# Patient Record
Sex: MALE | Race: WHITE | NOT HISPANIC OR LATINO | Employment: PART TIME | URBAN - METROPOLITAN AREA
[De-identification: names, ages, dates, MRNs, and addresses within clinical notes are randomized per-mention and may not be internally consistent; named-entity substitution may affect disease eponyms.]

---

## 2024-11-04 ENCOUNTER — HOSPITAL ENCOUNTER (EMERGENCY)
Facility: MEDICAL CENTER | Age: 34
End: 2024-11-10
Attending: EMERGENCY MEDICINE
Payer: COMMERCIAL

## 2024-11-04 DIAGNOSIS — Z00.8 MEDICAL CLEARANCE FOR PSYCHIATRIC ADMISSION: ICD-10-CM

## 2024-11-04 DIAGNOSIS — F23 ACUTE PSYCHOSIS (HCC): ICD-10-CM

## 2024-11-04 LAB
AMPHET UR QL SCN: NEGATIVE
BARBITURATES UR QL SCN: NEGATIVE
BENZODIAZ UR QL SCN: NEGATIVE
BZE UR QL SCN: NEGATIVE
CANNABINOIDS UR QL SCN: POSITIVE
FENTANYL UR QL: NEGATIVE
METHADONE UR QL SCN: NEGATIVE
OPIATES UR QL SCN: NEGATIVE
OXYCODONE UR QL SCN: NEGATIVE
PCP UR QL SCN: NEGATIVE
POC BREATHALIZER: 0 PERCENT (ref 0–0.01)
PROPOXYPH UR QL SCN: NEGATIVE

## 2024-11-04 PROCEDURE — 700102 HCHG RX REV CODE 250 W/ 637 OVERRIDE(OP): Performed by: EMERGENCY MEDICINE

## 2024-11-04 PROCEDURE — 99285 EMERGENCY DEPT VISIT HI MDM: CPT

## 2024-11-04 PROCEDURE — 700111 HCHG RX REV CODE 636 W/ 250 OVERRIDE (IP): Performed by: EMERGENCY MEDICINE

## 2024-11-04 PROCEDURE — 302970 POC BREATHALIZER: Performed by: EMERGENCY MEDICINE

## 2024-11-04 PROCEDURE — 80307 DRUG TEST PRSMV CHEM ANLYZR: CPT

## 2024-11-04 PROCEDURE — A9270 NON-COVERED ITEM OR SERVICE: HCPCS | Performed by: EMERGENCY MEDICINE

## 2024-11-04 PROCEDURE — 302970 POC BREATHALIZER

## 2024-11-04 PROCEDURE — 96372 THER/PROPH/DIAG INJ SC/IM: CPT

## 2024-11-04 PROCEDURE — 90791 PSYCH DIAGNOSTIC EVALUATION: CPT

## 2024-11-04 RX ORDER — HALOPERIDOL 5 MG/ML
5 INJECTION INTRAMUSCULAR EVERY 4 HOURS PRN
Status: DISCONTINUED | OUTPATIENT
Start: 2024-11-04 | End: 2024-11-08

## 2024-11-04 RX ORDER — LORAZEPAM 1 MG/1
1 TABLET ORAL ONCE
Status: COMPLETED | OUTPATIENT
Start: 2024-11-04 | End: 2024-11-04

## 2024-11-04 RX ORDER — LORAZEPAM 2 MG/ML
1 INJECTION INTRAMUSCULAR EVERY 4 HOURS PRN
Status: DISCONTINUED | OUTPATIENT
Start: 2024-11-04 | End: 2024-11-10 | Stop reason: HOSPADM

## 2024-11-04 RX ORDER — ZIPRASIDONE MESYLATE 20 MG/ML
20 INJECTION, POWDER, LYOPHILIZED, FOR SOLUTION INTRAMUSCULAR ONCE
Status: COMPLETED | OUTPATIENT
Start: 2024-11-04 | End: 2024-11-04

## 2024-11-04 RX ORDER — HALOPERIDOL 5 MG/1
5 TABLET ORAL ONCE
Status: COMPLETED | OUTPATIENT
Start: 2024-11-04 | End: 2024-11-04

## 2024-11-04 RX ADMIN — HALOPERIDOL 5 MG: 5 TABLET ORAL at 14:46

## 2024-11-04 RX ADMIN — LORAZEPAM 1 MG: 1 TABLET ORAL at 14:46

## 2024-11-04 RX ADMIN — ZIPRASIDONE MESYLATE 20 MG: 20 INJECTION, POWDER, LYOPHILIZED, FOR SOLUTION INTRAMUSCULAR at 15:53

## 2024-11-04 RX ADMIN — LORAZEPAM 1 MG: 2 INJECTION INTRAMUSCULAR; INTRAVENOUS at 15:21

## 2024-11-04 RX ADMIN — HALOPERIDOL LACTATE 5 MG: 5 INJECTION, SOLUTION INTRAMUSCULAR at 15:21

## 2024-11-04 NOTE — ED NOTES
Patient up from bed, quickly unlocked bed and pushed bed towards door and security. RN then removed bed from room, pt now stretching on floor. Security and sitter bedside.

## 2024-11-04 NOTE — ED NOTES
ERP called, pt appears to be escalating, pt standing on bed, with hands on rail, leaning forward. Pt redirected and is now cooperative, laying in bed. Patient aware that gurney will be removed if patient stands again. Sitter and security remain bedside.
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
## 2024-11-04 NOTE — ED NOTES
Pt provided urinal, urine sent to lab. Pt resting with even chest rise and fall, sitter and security remains bedside.
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
## 2024-11-05 LAB
ALBUMIN SERPL BCP-MCNC: 4.3 G/DL (ref 3.2–4.9)
ALBUMIN/GLOB SERPL: 1.5 G/DL
ALP SERPL-CCNC: 58 U/L (ref 30–99)
ALT SERPL-CCNC: 30 U/L (ref 2–50)
ANION GAP SERPL CALC-SCNC: 11 MMOL/L (ref 7–16)
AST SERPL-CCNC: 51 U/L (ref 12–45)
BASOPHILS # BLD AUTO: 0.2 % (ref 0–1.8)
BASOPHILS # BLD: 0.02 K/UL (ref 0–0.12)
BILIRUB SERPL-MCNC: 0.5 MG/DL (ref 0.1–1.5)
BUN SERPL-MCNC: 21 MG/DL (ref 8–22)
CALCIUM ALBUM COR SERPL-MCNC: 9.4 MG/DL (ref 8.5–10.5)
CALCIUM SERPL-MCNC: 9.6 MG/DL (ref 8.5–10.5)
CHLORIDE SERPL-SCNC: 104 MMOL/L (ref 96–112)
CO2 SERPL-SCNC: 26 MMOL/L (ref 20–33)
CREAT SERPL-MCNC: 1.28 MG/DL (ref 0.5–1.4)
EKG IMPRESSION: NORMAL
EOSINOPHIL # BLD AUTO: 0.14 K/UL (ref 0–0.51)
EOSINOPHIL NFR BLD: 1.7 % (ref 0–6.9)
ERYTHROCYTE [DISTWIDTH] IN BLOOD BY AUTOMATED COUNT: 42.9 FL (ref 35.9–50)
GFR SERPLBLD CREATININE-BSD FMLA CKD-EPI: 75 ML/MIN/1.73 M 2
GLOBULIN SER CALC-MCNC: 2.9 G/DL (ref 1.9–3.5)
GLUCOSE SERPL-MCNC: 72 MG/DL (ref 65–99)
HCT VFR BLD AUTO: 42.2 % (ref 42–52)
HGB BLD-MCNC: 14.5 G/DL (ref 14–18)
IMM GRANULOCYTES # BLD AUTO: 0.02 K/UL (ref 0–0.11)
IMM GRANULOCYTES NFR BLD AUTO: 0.2 % (ref 0–0.9)
LYMPHOCYTES # BLD AUTO: 1.59 K/UL (ref 1–4.8)
LYMPHOCYTES NFR BLD: 18.9 % (ref 22–41)
MCH RBC QN AUTO: 33.4 PG (ref 27–33)
MCHC RBC AUTO-ENTMCNC: 34.4 G/DL (ref 32.3–36.5)
MCV RBC AUTO: 97.2 FL (ref 81.4–97.8)
MONOCYTES # BLD AUTO: 0.77 K/UL (ref 0–0.85)
MONOCYTES NFR BLD AUTO: 9.2 % (ref 0–13.4)
NEUTROPHILS # BLD AUTO: 5.87 K/UL (ref 1.82–7.42)
NEUTROPHILS NFR BLD: 69.8 % (ref 44–72)
NRBC # BLD AUTO: 0 K/UL
NRBC BLD-RTO: 0 /100 WBC (ref 0–0.2)
PLATELET # BLD AUTO: 285 K/UL (ref 164–446)
PMV BLD AUTO: 9.7 FL (ref 9–12.9)
POTASSIUM SERPL-SCNC: 4.9 MMOL/L (ref 3.6–5.5)
PROT SERPL-MCNC: 7.2 G/DL (ref 6–8.2)
RBC # BLD AUTO: 4.34 M/UL (ref 4.7–6.1)
SODIUM SERPL-SCNC: 141 MMOL/L (ref 135–145)
TSH SERPL DL<=0.005 MIU/L-ACNC: 1.03 UIU/ML (ref 0.38–5.33)
WBC # BLD AUTO: 8.4 K/UL (ref 4.8–10.8)

## 2024-11-05 PROCEDURE — 85025 COMPLETE CBC W/AUTO DIFF WBC: CPT

## 2024-11-05 PROCEDURE — 80053 COMPREHEN METABOLIC PANEL: CPT

## 2024-11-05 PROCEDURE — 700102 HCHG RX REV CODE 250 W/ 637 OVERRIDE(OP): Performed by: EMERGENCY MEDICINE

## 2024-11-05 PROCEDURE — 84443 ASSAY THYROID STIM HORMONE: CPT

## 2024-11-05 PROCEDURE — 36415 COLL VENOUS BLD VENIPUNCTURE: CPT

## 2024-11-05 PROCEDURE — A9270 NON-COVERED ITEM OR SERVICE: HCPCS | Performed by: EMERGENCY MEDICINE

## 2024-11-05 PROCEDURE — 93005 ELECTROCARDIOGRAM TRACING: CPT

## 2024-11-05 RX ORDER — CLONIDINE HYDROCHLORIDE 0.1 MG/1
0.2 TABLET ORAL EVERY 6 HOURS PRN
Status: DISCONTINUED | OUTPATIENT
Start: 2024-11-05 | End: 2024-11-10 | Stop reason: HOSPADM

## 2024-11-05 RX ORDER — LORAZEPAM 1 MG/1
1 TABLET ORAL ONCE
Status: COMPLETED | OUTPATIENT
Start: 2024-11-05 | End: 2024-11-05

## 2024-11-05 RX ORDER — GABAPENTIN 300 MG/1
300 CAPSULE ORAL 3 TIMES DAILY
Status: DISCONTINUED | OUTPATIENT
Start: 2024-11-05 | End: 2024-11-07

## 2024-11-05 RX ORDER — NICOTINE 21 MG/24HR
1 PATCH, TRANSDERMAL 24 HOURS TRANSDERMAL ONCE
Status: COMPLETED | OUTPATIENT
Start: 2024-11-05 | End: 2024-11-06

## 2024-11-05 RX ADMIN — GABAPENTIN 300 MG: 300 CAPSULE ORAL at 18:04

## 2024-11-05 RX ADMIN — CLONIDINE HYDROCHLORIDE 0.2 MG: 0.1 TABLET ORAL at 18:04

## 2024-11-05 RX ADMIN — LORAZEPAM 1 MG: 1 TABLET ORAL at 22:43

## 2024-11-05 RX ADMIN — GABAPENTIN 300 MG: 300 CAPSULE ORAL at 15:07

## 2024-11-05 RX ADMIN — NICOTINE TRANSDERMAL SYSTEM 21 MG: 21 PATCH, EXTENDED RELEASE TRANSDERMAL at 12:03

## 2024-11-05 NOTE — ED NOTES
Pt is lying on hospital mattress w/blankets over head, (+) chest rise/fall, NAD. Sitter and security remain in direct observation of the Pt.

## 2024-11-05 NOTE — ED NOTES
Patient resting on mattress , equal chest rise and fall, pt in 1:1 observation with sitter and  in direct view, pt in hospital scrubs, no needs at this time.

## 2024-11-05 NOTE — DISCHARGE PLANNING
Confirmed with [facility] that pt is accepted, chart is not flagged. May  proceed with admission. Spoke to [place] at [facility], transportation at 0800 confirmed.      Noel from [facility] called, states spouse has been calling with demands that the situation be discussed with risk management. Noel requests that transfer be delayed until he can talk to risk management.
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
## 2024-11-05 NOTE — ED NOTES
Bedside report received from off going RN/tech: anna, assumed care of patient.  POC discussed with patient. All needs addressed at this time.       Fall risk interventions in place: Not Applicable (all applicable per [place] Fall risk assessment)   Continuous monitoring: Not Applicable   IVF/IV medications: Not Applicable   Oxygen: Room Air  Bedside sitter: Pt on L2k SI with 1:1 sitter security (name), Report given to sitter, and checklist completed, stop sign in doorway  Isolation: Not Applicable

## 2024-11-05 NOTE — ED NOTES
Patient resting with eyes closed. Equal chest rise and fall. 1:1 sitter and security at bedside.

## 2024-11-05 NOTE — ED NOTES
Pt is resting comfortably on hospital mattress w/eyes closed, (+) chest rise/fall, NAD. Sitter and security remain in direct observation of the Pt.
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
## 2024-11-05 NOTE — ED NOTES
Pt is resting comfortably on hospital mattress, (+) chest rise/fall, NAD. Sitter and security remains in direct observation of the Pt.

## 2024-11-05 NOTE — ED NOTES
Bedside report received from off going RN/tech: Ryan MACK, assumed care of patient.  POC discussed with patient. Call light within reach, all needs addressed at this time.       Fall risk interventions in place: Move the patient closer to the nurse's station and Keep floor surfaces clean and dry (all applicable per [place] Fall risk assessment)   Continuous monitoring: Not Applicable   IVF/IV medications: Not Applicable   Oxygen: Room Air  Bedside sitter: Pt on L2k SI with 1:1 sitter Jessica (name), Report given to sitter, checklist completed, and checklist completed, stop sign in doorway  Isolation: Not Applicable

## 2024-11-05 NOTE — ED NOTES
Pt requested wife (Rizwana) be contacted and given an update. Alert team notified and they will call and inform her of any updates.  Pt provided with sandwich, sole crackers and saltines, apple sauce and fruit cup, apple juice, cranberry juice, and coffee for breakfast.

## 2024-11-05 NOTE — DISCHARGE PLANNING
Alert Team:     Referral: Adult Patient Transfer to Mental Health Facility     Intervention: Received call from Sharlene at [facility] stating that Dr. Myers has accepted the patient for admission.  Facility requests that transport be arranged for 0800.     Arranged for transportation to be set up through [facility] with [employer].     The pt will be picked up at 0800.      Notified the RN of the departure time as well as accepting facility.      Created transfer packet and placed on chart.      Plan: Pt will transfer to [facility] at 0800.

## 2024-11-05 NOTE — ED NOTES
L2K precautions in place w/ 1:1 security sitter at bedside w/ eyes on pt at all time. Pt aware of POC, reports no needs at this time.

## 2024-11-05 NOTE — ED NOTES
Pt has L2K precautions in place w/ 1:1 sitter w/ security at bedside w/ eyes on pt at all time. Pt aware of POC, reports no needs at this time.

## 2024-11-05 NOTE — ED NOTES
Pt resting, eyes closed, with even chest rise and fall, pulse ox momentarily placed on pt, pt oxygen maintaining saturation at 97%.  1:1 sitter and security remains bedside.

## 2024-11-05 NOTE — ED NOTES
Bedside report received from off going RN/tech: Alexandria, assumed care of patient.  POC discussed with patient. All needs addressed at this time.       Fall risk interventions in place: Keep floor surfaces clean and dry and Accompanied to restroom (all applicable per [place] Fall risk assessment)   Continuous monitoring: Pulse Ox or Blood Pressure  IVF/IV medications: Not Applicable   Oxygen: Room Air  Bedside sitter: Pt on L2k HI with sitter and security, Report given to sitter, and checklist completed, stop sign in doorway  Isolation: Not Applicable

## 2024-11-05 NOTE — PROGRESS NOTES
"ED Observation Progress Note    Date of Service: 11/05/24    Interval History and Interventions  Patient admitted because of threatening behavior and psychosis.  Recent visit [facility] where due to the specific and others request for treatment as an outpatient he was discharged but continues to become more psychotic and therefore is put on legal 2000    ED course today was unremarkable he did require reso nicotine patch but no other complaints at this time    Due to the chart review of uncertain etiology but appears the patient was initially can go to be behavioral health but some sort of issue arose and court order was placed.  Subsequently patient was also given [facility], [facility] and [facility] looks like [facility] has rejected the patient.    Physical Exam  BP (P) 134/59   Pulse (P) 72   Temp 36.2 °C (97.2 °F) (Temporal)   Resp (P) 18   Ht 1.803 m (5' 11\")   Wt 61.2 kg (135 lb)   SpO2 (P) 98%   BMI 18.83 kg/m² .    Constitutional: Awake and alert. Nontoxic  HENT:  Grossly normal  Eyes: Grossly normal  Neck: Normal range of motion  Cardiovascular: Normal heart rate   Thorax & Lungs: No respiratory distress  Abdomen: Nontender  Skin:  No pathologic rash.   Extremities: Well perfused  Psychiatric: Affect normal    Labs  Results for orders placed or performed during the hospital encounter of 11/04/24   POC BREATHALIZER    Collection Time: 11/04/24  2:05 PM   Result Value Ref Range    POC Breathalizer 0.00 0.00 - 0.01 Percent   Urine Drug Screen    Collection Time: 11/04/24  2:33 PM   Result Value Ref Range    Amphetamines Urine Negative Negative    Barbiturates Negative Negative    Benzodiazepines Negative Negative    Cocaine Metabolite Negative Negative    Fentanyl, Urine Negative Negative    Methadone Negative Negative    Opiates Negative Negative    Oxycodone Negative Negative    Phencyclidine -Pcp Negative Negative    Propoxyphene Negative Negative    Cannabinoid Metab " Positive (A) Negative       Radiology  No orders to display       Problem List  1.  Acute psychosis      Noah, looks like we are waiting still for [facility] and car or [facility] to accept the patient    Electronically signed by: David Geronimo M.D., 11/5/2024 1:52 PM

## 2024-11-05 NOTE — ED NOTES
Patient's mother Sharon called, permission from pt to tell mom he is here. 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 Reports hx of bipolar in family. Sharon [phone].

## 2024-11-05 NOTE — DISCHARGE PLANNING
Alert team  Referral faxed to [facility], [facility]  Macey TOLEDO

## 2024-11-05 NOTE — ED NOTES
Patient resting with eyes closed and equal chest rise and fall. 1:1 sitter and security at bedside. Patient provided with mattress pad. Patient denied further needs at this time.

## 2024-11-05 NOTE — ED NOTES
Pt ambulated to the bathroom w/steady gait and security at [facility]. All needs addressed at this time. Upon returning to room, sitter and security remain in direct observation of the Pt.

## 2024-11-05 NOTE — ED NOTES
Patient's home medications have been reviewed by the pharmacy team.     No past medical history on file.    Patient's Medications   New Prescriptions    No medications on file   Previous Medications    MELOXICAM (MOBIC) 7.5 MG TABS    Take 7.5 mg by mouth every day.   Modified Medications    No medications on file   Discontinued Medications    No medications on file          A:  Medications do not appear to be contributing to current complaints.       P:    Psychiatry provider at [facility] had recommended initiating on gabapentin 300 mg TID for mood stability, anxiety, history of neuropathy and clonidine 0.2 mg PRN for impulsivity and anxiety. No evidence that patient had picked up these prescriptions or had started taking these medications. Current agitation is being managed by IM PRN haldol and lorazepam       Garfield Gabriel, GabrielD

## 2024-11-05 NOTE — ED NOTES
Patient resting with eyes closed on mattress. Equal chest rise and fall. Patient easily awakens and denied any needs at this time. 1:1 sitter and security at bedside.

## 2024-11-05 NOTE — ED NOTES
Pt has L2K precautions in place w/ 1:1 sitter at bedside w/ eyes on pt at all time. Pt aware of POC, ambulated to restroom w/ steady gait.
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
## 2024-11-05 NOTE — DISCHARGE PLANNING
Alert Team Note     Contacted [place] at [facility] to cancel transport to [facility] due to no contact order.    Resent referral to [facility], [facility], and [facility]

## 2024-11-05 NOTE — ED NOTES
Patient resting on mattress, equal chest rise and fall, pt in 1:1 observation with sitter and security in direct view, pt in hospital scrubs, no needs at this time.

## 2024-11-05 NOTE — DISCHARGE PLANNING
Alert Team Note     Spoke to Sarai at [facility], was advised pt declined. They are unable to meet pt needs at this time. Pt too acute and aggressive for current milieu    Notified Alert Team MARTINA Baker

## 2024-11-05 NOTE — ED NOTES
Pt medicated per MAR. Pt resting, eyes closed, with even chest rise and fall, 1:1 sitter remains bedside.

## 2024-11-06 PROCEDURE — 700111 HCHG RX REV CODE 636 W/ 250 OVERRIDE (IP): Performed by: EMERGENCY MEDICINE

## 2024-11-06 PROCEDURE — 700111 HCHG RX REV CODE 636 W/ 250 OVERRIDE (IP): Mod: JZ | Performed by: EMERGENCY MEDICINE

## 2024-11-06 PROCEDURE — 700102 HCHG RX REV CODE 250 W/ 637 OVERRIDE(OP)

## 2024-11-06 PROCEDURE — A9270 NON-COVERED ITEM OR SERVICE: HCPCS

## 2024-11-06 PROCEDURE — 700102 HCHG RX REV CODE 250 W/ 637 OVERRIDE(OP): Performed by: EMERGENCY MEDICINE

## 2024-11-06 PROCEDURE — 96372 THER/PROPH/DIAG INJ SC/IM: CPT

## 2024-11-06 PROCEDURE — 99284 EMERGENCY DEPT VISIT MOD MDM: CPT | Performed by: PSYCHIATRY & NEUROLOGY

## 2024-11-06 PROCEDURE — A9270 NON-COVERED ITEM OR SERVICE: HCPCS | Performed by: EMERGENCY MEDICINE

## 2024-11-06 RX ORDER — HALOPERIDOL 5 MG/ML
5 INJECTION INTRAMUSCULAR ONCE
Status: COMPLETED | OUTPATIENT
Start: 2024-11-06 | End: 2024-11-06

## 2024-11-06 RX ORDER — HALOPERIDOL 5 MG/1
5 TABLET ORAL ONCE
Status: DISPENSED | OUTPATIENT
Start: 2024-11-06 | End: 2024-11-07

## 2024-11-06 RX ORDER — NICOTINE 21 MG/24HR
1 PATCH, TRANSDERMAL 24 HOURS TRANSDERMAL ONCE
Status: COMPLETED | OUTPATIENT
Start: 2024-11-06 | End: 2024-11-07

## 2024-11-06 RX ADMIN — CLONIDINE HYDROCHLORIDE 0.2 MG: 0.1 TABLET ORAL at 11:43

## 2024-11-06 RX ADMIN — HALOPERIDOL LACTATE 5 MG: 5 INJECTION, SOLUTION INTRAMUSCULAR at 20:24

## 2024-11-06 RX ADMIN — CLONIDINE HYDROCHLORIDE 0.2 MG: 0.1 TABLET ORAL at 04:26

## 2024-11-06 RX ADMIN — GABAPENTIN 300 MG: 300 CAPSULE ORAL at 05:05

## 2024-11-06 RX ADMIN — GABAPENTIN 300 MG: 300 CAPSULE ORAL at 11:43

## 2024-11-06 RX ADMIN — LORAZEPAM 1 MG: 2 INJECTION INTRAMUSCULAR; INTRAVENOUS at 19:14

## 2024-11-06 RX ADMIN — NICOTINE TRANSDERMAL SYSTEM 21 MG: 21 PATCH, EXTENDED RELEASE TRANSDERMAL at 09:42

## 2024-11-06 RX ADMIN — CLONIDINE HYDROCHLORIDE 0.2 MG: 0.1 TABLET ORAL at 17:42

## 2024-11-06 RX ADMIN — GABAPENTIN 300 MG: 300 CAPSULE ORAL at 17:42

## 2024-11-06 ASSESSMENT — ENCOUNTER SYMPTOMS
MUSCULOSKELETAL NEGATIVE: 1
DIARRHEA: 0
RESPIRATORY NEGATIVE: 1
NAUSEA: 0
NEUROLOGICAL NEGATIVE: 1
ABDOMINAL PAIN: 0
CARDIOVASCULAR NEGATIVE: 1
VOMITING: 0

## 2024-11-06 NOTE — ED NOTES
Pt agitated and banging on the door and yelling at staff. Attempted to verbally de-escalate with no success. Pt agrees to take medication to help calm him as long as it's PO. Updated ERP, verbal order received. Attempted to medicate pt with security at bedside.- pt calm and refusing medication at this time. Updated pt that his court time is at 14:00. Pt agrees to stay calm and cooperative. Security and safety sitter in direct view of pt.

## 2024-11-06 NOTE — ED NOTES
Patient report from MARTINA aSlmeron. Pt Alert respirations even and unlabored, on room air. Safety risk interventions in place.     Verified that Legal Hold is signed in patient's chart.  Safety checklist in use. Patient room is cleared of all items posing safety risk to minimize risk of suicide.   Stop sign filled up and placed in front of pt's room  Patient is in direct view of 1:1 safety sitter and Security, will continue to observe.  Standard fall precautions in place.

## 2024-11-06 NOTE — ED NOTES
Pt is agitated, biting self on arms.  refusing Ativan and haldol. Requesting clonidine and gabapentin. Informed pt that he received both at 1143. Pt able to be redirected to sit and eat his lunch. Pt requesting to be referred to as leobardo at this time.

## 2024-11-06 NOTE — ED NOTES
L2K precautions in place w/ 1:1 sitter at bedside w/ eyes on pt at all time. Pt aware of POC, reports no needs at this time.

## 2024-11-06 NOTE — ED NOTES
Received report from MARTINA Sepulveda. Security sitter in direct view of pt. Safety precautions in place for SI/Hi pt. Pt has court today, time unknown.

## 2024-11-06 NOTE — DISCHARGE PLANNING
Received email from  Steffi regarding patient's meeting with the court doctors today. Patient has been scheduled to meet with court doctors via the Ipad at bedside at 1400. Notified Charge MARTINA Coronado and bedside MARTINA Bowen of upcoming meeting this afternoon.

## 2024-11-06 NOTE — ED NOTES
Pt screaming at the RN to read his chart and look at his EKG because it is in my job description to do so. Pt wanting RN to update his pt board. Pt pushing code blue button and yelling at the top of his lungs. Security at bedside.

## 2024-11-06 NOTE — ED NOTES
Bedside report received from off going RN/tech: Claire, assumed care of patient.  POC discussed with patient. Call light within reach, all needs addressed at this time.       Fall risk interventions in place: Accompanied to restroom (all applicable per [place] Fall risk assessment)   Continuous monitoring: Not Applicable   IVF/IV medications: Not Applicable   Oxygen: Room Air  Bedside sitter: Pt on L2k SI with 1:1 sitter  + security  Isolation:
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
## 2024-11-06 NOTE — ED NOTES
Hourly round completed, patient attempting to do cart wheel in room, patient redirect. 1:1 sitter and security sitter are in direct view of patient, will continue to monitor patient.

## 2024-11-06 NOTE — DISCHARGE PLANNING
Legal Hold    Referral: Legal Hold Court     Intervention: Pt met with court MD's via the iPad at bedside to contest the legal hold.     Court MD's did not release pt from legal hold. Pt will need to meet with District  11/7 via video conferencing. Once time has been determined will notify bedside R.N.

## 2024-11-06 NOTE — ED NOTES
Pt laying on side, chest rise and fall even/unlabored.   Pt remains in hospital provided paper scrubs.   1:1 sitter + security  w/in line of site at all times.

## 2024-11-06 NOTE — ED NOTES
"patient is extremely restless and masturbating in room stating that \"make me relax\". This nurse speak with patient and try to educate him if he wants to channelize his energy to games that we have here in [facility], also this nurse offer him PRNs he denies taking IM meds , but agreed on PO lorazepam, ERP  notified.   "

## 2024-11-06 NOTE — ED NOTES
"Pt deciding to do \"yoga\" in room.  no acute signs of distress present. Even, unlabored breathing noted. 1:1 sitter with direct line of sight remains in place.     "

## 2024-11-06 NOTE — DISCHARGE PLANNING
Legal Hold    Referral: Legal Hold Court     Intervention: Pt presented for legal hold meeting with  via phone call.  advised pt will meet with court MD's via telemedicine monitor to contest the legal hold.      Plan: Pt will present to telemedicine mental health to meet with court physicians 11/6. Will call bedside RN once time has been determined.

## 2024-11-06 NOTE — ED NOTES
Patient pacing room. Safety sitter and security sitter in direct view of patient. Will continue to monitor.

## 2024-11-06 NOTE — ED NOTES
Patient report to MARTINA Sepulveda. Pt Alert respirations even and unlabored, on room air. Safety risk interventions in place.     Verified that Legal Hold is signed in patient's chart.  Safety checklist in use. Patient room is cleared of all items posing safety risk  Stop sign filled up and placed in front of pt's room  Patient is in direct view of security sitter and  1:1 sitter.  Standard fall precautions in place.

## 2024-11-06 NOTE — ED NOTES
Medicated pt with nicotine patch. Safety sitter Nuno and security sitter Alok in direct view of pt.

## 2024-11-06 NOTE — ED NOTES
Hourly round completed, patient resting eyes close with unlabored respirations. Face visible, No current needs identified. Security and 1:1 is in direct view of patient, will continue to monitor patient.

## 2024-11-06 NOTE — ED NOTES
Patient requested ear plug, to sleep comfortably, provided as per request, safety sitter notified.

## 2024-11-06 NOTE — PROGRESS NOTES
"ED Observation Progress Note    Date of Service: 24    Interval History and Interventions  Patient here for psychosis and threatening behavior.  Patient was seen by my preceding partners and medically cleared.  Patient was placed on legal hold for these issues and is currently awaiting placement in psychiatric facility.    Physical Exam  /54   Pulse 86   Temp 36.2 °C (97.2 °F) (Temporal)   Resp 16   Ht 1.803 m (5' 11\")   Wt 61.2 kg (135 lb)   SpO2 100%   BMI 18.83 kg/m² .    Constitutional: Awake and alert. Nontoxic  HENT:  Grossly normal  Eyes: Grossly normal  Neck: Normal range of motion  Cardiovascular: Normal heart rate   Thorax & Lungs: No respiratory distress  Skin:  No pathologic rash.   Extremities: Well perfused  Psychiatric: Affect normal    Labs  Results for orders placed or performed during the hospital encounter of 24   POC BREATHALIZER    Collection Time: 24  2:05 PM   Result Value Ref Range    POC Breathalizer 0.00 0.00 - 0.01 Percent   Urine Drug Screen    Collection Time: 24  2:33 PM   Result Value Ref Range    Amphetamines Urine Negative Negative    Barbiturates Negative Negative    Benzodiazepines Negative Negative    Cocaine Metabolite Negative Negative    Fentanyl, Urine Negative Negative    Methadone Negative Negative    Opiates Negative Negative    Oxycodone Negative Negative    Phencyclidine -Pcp Negative Negative    Propoxyphene Negative Negative    Cannabinoid Metab Positive (A) Negative   EKG    Collection Time: 24  4:34 PM   Result Value Ref Range    Report       [facility] Emergency Dept.    Test Date:  2024  Pt Name:    BARBY WETZEL             Department: ER  MRN:        [id number]                      Room:       [facility]  Gender:     Male                         Technician: [id number]  :        1990                   Requested By:FELICITY BRAVO  Order #:    [id number]                    Reading MD: Robert " Pancho    Measurements  Intervals                                Axis  Rate:       83                           P:          89  MT:         156                          QRS:        101  QRSD:       85                           T:          85  QT:         354  QTc:        416    Interpretive Statements  Sinus rhythm  No previous ECG available for comparison  Electronically Signed On 11- 22:36:52 PST by Robert Deleon     CBC WITH DIFFERENTIAL    Collection Time: 11/05/24  4:41 PM   Result Value Ref Range    WBC 8.4 4.8 - 10.8 K/uL    RBC 4.34 (L) 4.70 - 6.10 M/uL    Hemoglobin 14.5 14.0 - 18.0 g/dL    Hematocrit 42.2 42.0 - 52.0 %    MCV 97.2 81.4 - 97.8 fL    MCH 33.4 (H) 27.0 - 33.0 pg    MCHC 34.4 32.3 - 36.5 g/dL    RDW 42.9 35.9 - 50.0 fL    Platelet Count 285 164 - 446 K/uL    MPV 9.7 9.0 - 12.9 fL    Neutrophils-Polys 69.80 44.00 - 72.00 %    Lymphocytes 18.90 (L) 22.00 - 41.00 %    Monocytes 9.20 0.00 - 13.40 %    Eosinophils 1.70 0.00 - 6.90 %    Basophils 0.20 0.00 - 1.80 %    Immature Granulocytes 0.20 0.00 - 0.90 %    Nucleated RBC 0.00 0.00 - 0.20 /100 WBC    Neutrophils (Absolute) 5.87 1.82 - 7.42 K/uL    Lymphs (Absolute) 1.59 1.00 - 4.80 K/uL    Monos (Absolute) 0.77 0.00 - 0.85 K/uL    Eos (Absolute) 0.14 0.00 - 0.51 K/uL    Baso (Absolute) 0.02 0.00 - 0.12 K/uL    Immature Granulocytes (abs) 0.02 0.00 - 0.11 K/uL    NRBC (Absolute) 0.00 K/uL   Comp Metabolic Panel    Collection Time: 11/05/24  4:41 PM   Result Value Ref Range    Sodium 141 135 - 145 mmol/L    Potassium 4.9 3.6 - 5.5 mmol/L    Chloride 104 96 - 112 mmol/L    Co2 26 20 - 33 mmol/L    Anion Gap 11.0 7.0 - 16.0    Glucose 72 65 - 99 mg/dL    Bun 21 8 - 22 mg/dL    Creatinine 1.28 0.50 - 1.40 mg/dL    Calcium 9.6 8.5 - 10.5 mg/dL    Correct Calcium 9.4 8.5 - 10.5 mg/dL    AST(SGOT) 51 (H) 12 - 45 U/L    ALT(SGPT) 30 2 - 50 U/L    Alkaline Phosphatase 58 30 - 99 U/L    Total Bilirubin 0.5 0.1 - 1.5 mg/dL    Albumin 4.3 3.2 - 4.9 g/dL     Total Protein 7.2 6.0 - 8.2 g/dL    Globulin 2.9 1.9 - 3.5 g/dL    A-G Ratio 1.5 g/dL   TSH WITH REFLEX TO FT4    Collection Time: 11/05/24  4:41 PM   Result Value Ref Range    TSH 1.030 0.380 - 5.330 uIU/mL   ESTIMATED GFR    Collection Time: 11/05/24  4:41 PM   Result Value Ref Range    GFR (CKD-EPI) 75 >60 mL/min/1.73 m 2       Radiology  No orders to display       Problem List    1. Acute psychosis (HCC)    2. Medical clearance for psychiatric admission

## 2024-11-06 NOTE — ED NOTES
Hourly round completed, patient provided with apple juice and dinner tray. 1:1 sitter and security sitter are in direct view of patient, will continue to monitor patient.

## 2024-11-06 NOTE — ED NOTES
Pt continues to be anxious and pacing room. Took pt's vitals and gave PRN clonidine. Security and safety sitter in direct view of pt. Will continue to monitor.

## 2024-11-07 PROCEDURE — 700102 HCHG RX REV CODE 250 W/ 637 OVERRIDE(OP): Performed by: EMERGENCY MEDICINE

## 2024-11-07 PROCEDURE — 700111 HCHG RX REV CODE 636 W/ 250 OVERRIDE (IP): Performed by: EMERGENCY MEDICINE

## 2024-11-07 PROCEDURE — 700102 HCHG RX REV CODE 250 W/ 637 OVERRIDE(OP): Performed by: PSYCHIATRY & NEUROLOGY

## 2024-11-07 PROCEDURE — 99284 EMERGENCY DEPT VISIT MOD MDM: CPT | Performed by: PSYCHIATRY & NEUROLOGY

## 2024-11-07 PROCEDURE — A9270 NON-COVERED ITEM OR SERVICE: HCPCS | Performed by: EMERGENCY MEDICINE

## 2024-11-07 PROCEDURE — A9270 NON-COVERED ITEM OR SERVICE: HCPCS | Performed by: PSYCHIATRY & NEUROLOGY

## 2024-11-07 RX ORDER — LORAZEPAM 2 MG/ML
2 INJECTION INTRAMUSCULAR ONCE
Status: COMPLETED | OUTPATIENT
Start: 2024-11-07 | End: 2024-11-07

## 2024-11-07 RX ORDER — HALOPERIDOL 5 MG/ML
5 INJECTION INTRAMUSCULAR ONCE
Status: COMPLETED | OUTPATIENT
Start: 2024-11-07 | End: 2024-11-07

## 2024-11-07 RX ORDER — GABAPENTIN 400 MG/1
400 CAPSULE ORAL 3 TIMES DAILY
Status: DISCONTINUED | OUTPATIENT
Start: 2024-11-07 | End: 2024-11-10 | Stop reason: HOSPADM

## 2024-11-07 RX ORDER — NICOTINE 21 MG/24HR
21 PATCH, TRANSDERMAL 24 HOURS TRANSDERMAL
Status: DISCONTINUED | OUTPATIENT
Start: 2024-11-08 | End: 2024-11-10 | Stop reason: HOSPADM

## 2024-11-07 RX ORDER — NICOTINE 21 MG/24HR
1 PATCH, TRANSDERMAL 24 HOURS TRANSDERMAL ONCE
Status: COMPLETED | OUTPATIENT
Start: 2024-11-07 | End: 2024-11-08

## 2024-11-07 RX ADMIN — LORAZEPAM 2 MG: 2 INJECTION INTRAMUSCULAR; INTRAVENOUS at 10:28

## 2024-11-07 RX ADMIN — CLONIDINE HYDROCHLORIDE 0.2 MG: 0.1 TABLET ORAL at 21:14

## 2024-11-07 RX ADMIN — NICOTINE 21 MG: 21 PATCH TRANSDERMAL at 10:45

## 2024-11-07 RX ADMIN — HALOPERIDOL LACTATE 5 MG: 5 INJECTION, SOLUTION INTRAMUSCULAR at 10:26

## 2024-11-07 RX ADMIN — GABAPENTIN 300 MG: 300 CAPSULE ORAL at 10:45

## 2024-11-07 RX ADMIN — GABAPENTIN 400 MG: 400 CAPSULE ORAL at 17:22

## 2024-11-07 ASSESSMENT — ENCOUNTER SYMPTOMS
HEADACHES: 0
VOMITING: 0
NAUSEA: 0
CONSTIPATION: 0
SHORTNESS OF BREATH: 0
DIARRHEA: 0
DIZZINESS: 0
ABDOMINAL PAIN: 0

## 2024-11-07 NOTE — DISCHARGE PLANNING
"Alert Team Note     Requested updates on referral status from Coral at [facility] via [employer].     @0801 Received message from Shanthi at [facility] stating, \"at this time we are prioritizing our uninsured referrals. He will remain on our list.\"    Spoke to Juanito at [facility] confirming pt stills need placement. Referral pending review with therapist.  "

## 2024-11-07 NOTE — ED NOTES
BREAK RN:   Pt urinal emptied, pt attempting to get out of room while increasing in agitation, security de-escalating and returned pt inside room. Pt agitation increased, ERP called, security called for extra assistance. New orders placed for 5mg IM haldol. Security at bedside for medication administration. Pt educated on medication administration, pt verbalized understanding.

## 2024-11-07 NOTE — ED NOTES
Patient awake and alert , in ED room cooperative and calm at this time. Security sitter in direct view of patient.

## 2024-11-07 NOTE — ED NOTES
Pt resting on the floor mattress , no acute signs of distress present. Even, unlabored breathing noted. 1:1  with direct line of sight remains in place.

## 2024-11-07 NOTE — DISCHARGE PLANNING
Received email from  Steffi regarding patient's court hearing with District [place] this morning. Patient has been scheduled to meet with Judge Ortiz via the iPad at bedside at 0900. Notified Bedside MARTINA Morrison of upcoming hearing this morning.

## 2024-11-07 NOTE — ED NOTES
Report received from MARTINA Rangel. Assumed care of pt, pt up in room, brushing teeth. Security sitter at bedside.

## 2024-11-07 NOTE — ED NOTES
Hourly round completed, patient resting with unlabored respirations. Face visible, No current needs identified.  Gurney in low position, side rail up for pt safety. Security sitter is in direct view of patient, will continue to monitor patient.
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
## 2024-11-07 NOTE — ED NOTES
Patient's home medications have been reviewed by the pharmacy team.     No past medical history on file.    Patient's Medications   New Prescriptions    No medications on file   Previous Medications    MELOXICAM (MOBIC) 7.5 MG TABS    Take 7.5 mg by mouth every day.   Modified Medications    No medications on file   Discontinued Medications    No medications on file          Unable to complete medication history per med rec tech note       No recommendations at this time. Defer to psychiatry.         Claudia Croft Pharm.D., BCPS

## 2024-11-07 NOTE — ED NOTES
Patient report to MARTINA Rodriguez. Pt Alert respirations even and unlabored, on room air. Safety risk interventions in place.     Verified that Legal Hold is signed in patient's chart.  Safety checklist in use. Patient room is cleared of all items posing safety risk to minimize risk of HI.   Stop sign filled up and placed in front of pt's room  Patient is in direct view of security sitter, will continue to observe.  Standard fall precautions in place.

## 2024-11-07 NOTE — ED NOTES
Patient resting in bed, appears to be sleeping with even rise and fall of chest noted, sitter in hallway performing direct observation, repositions self occasionally.

## 2024-11-07 NOTE — DISCHARGE PLANNING
Pt met with  Diana for court hearing regarding legal hold being released or not. Legal hold was not released. Pt has been court committed to a mental health facility. Once court ordered commitment is received will scan into pt's chart.

## 2024-11-07 NOTE — DISCHARGE PLANNING
Received order granting petition for court ordered involuntary admission from the court committing pt to any accepting mental health facility for up to 6 months(5/7/2025), scanned copy into pt's chart.

## 2024-11-07 NOTE — ED NOTES
Report to MARTINA Finney  Security and sitter remain in place. Pt resting on left side with eyes closed.

## 2024-11-07 NOTE — ED NOTES
"Pt at doorway, speaking to security about a multitude of things, a mixture of singing and dancing. Pt remains in room, but also doing \"yoga\" in doorway.   "

## 2024-11-07 NOTE — ED NOTES
Received Report from MARTINA Razo . Assumed care at this time . Pt resting on gurney, no acute signs of distress present. Even, unlabored breathing noted. 1:1 sitter with direct line of sight remains in place.

## 2024-11-07 NOTE — ED NOTES
Pt with increased agitation. Throwing blankets in room, screaming and cursing.  Medicated per MAR.

## 2024-11-07 NOTE — ED NOTES
Patient report to MARTINA Hyde. Pt Alert respirations even and unlabored, on room air. Safety risk interventions in place.     Verified that Legal Hold is signed in patient's chart.  Safety checklist in use. Patient room is cleared of all items posing safety risk to minimize risk of HI    Stop sign filled up and placed in front of pt's room  Patient is in direct view of security sitter, will continue to observe.  Standard fall precautions in place.

## 2024-11-07 NOTE — ED NOTES
Report received. Bedside rounding completed. Security and sitter in place for close obs. Pt laying on mattress on floor. + chest rise noted.

## 2024-11-07 NOTE — ED NOTES
with the Pt has brought to my attention that Pt was having 1:1  and Sitter last night, upon report received Pt was report as HI only, I ask PASTOR Coronado regarding Officer concern and states that during day time decision to eliminated sitter was make due to Pt only been HI .

## 2024-11-08 PROCEDURE — 700111 HCHG RX REV CODE 636 W/ 250 OVERRIDE (IP): Mod: JZ

## 2024-11-08 PROCEDURE — A9270 NON-COVERED ITEM OR SERVICE: HCPCS | Performed by: PSYCHIATRY & NEUROLOGY

## 2024-11-08 PROCEDURE — A9270 NON-COVERED ITEM OR SERVICE: HCPCS

## 2024-11-08 PROCEDURE — 700102 HCHG RX REV CODE 250 W/ 637 OVERRIDE(OP): Performed by: EMERGENCY MEDICINE

## 2024-11-08 PROCEDURE — 700102 HCHG RX REV CODE 250 W/ 637 OVERRIDE(OP)

## 2024-11-08 PROCEDURE — 96374 THER/PROPH/DIAG INJ IV PUSH: CPT

## 2024-11-08 PROCEDURE — A9270 NON-COVERED ITEM OR SERVICE: HCPCS | Performed by: EMERGENCY MEDICINE

## 2024-11-08 PROCEDURE — 700111 HCHG RX REV CODE 636 W/ 250 OVERRIDE (IP): Performed by: EMERGENCY MEDICINE

## 2024-11-08 PROCEDURE — 700101 HCHG RX REV CODE 250: Performed by: EMERGENCY MEDICINE

## 2024-11-08 PROCEDURE — 700102 HCHG RX REV CODE 250 W/ 637 OVERRIDE(OP): Performed by: PSYCHIATRY & NEUROLOGY

## 2024-11-08 PROCEDURE — 96372 THER/PROPH/DIAG INJ SC/IM: CPT

## 2024-11-08 PROCEDURE — 93005 ELECTROCARDIOGRAM TRACING: CPT | Performed by: STUDENT IN AN ORGANIZED HEALTH CARE EDUCATION/TRAINING PROGRAM

## 2024-11-08 PROCEDURE — 700111 HCHG RX REV CODE 636 W/ 250 OVERRIDE (IP): Mod: JZ | Performed by: STUDENT IN AN ORGANIZED HEALTH CARE EDUCATION/TRAINING PROGRAM

## 2024-11-08 RX ORDER — ZIPRASIDONE MESYLATE 20 MG/ML
20 INJECTION, POWDER, LYOPHILIZED, FOR SOLUTION INTRAMUSCULAR EVERY 4 HOURS PRN
Status: DISCONTINUED | OUTPATIENT
Start: 2024-11-08 | End: 2024-11-10 | Stop reason: HOSPADM

## 2024-11-08 RX ORDER — RISPERIDONE 1 MG/1
1 TABLET ORAL 2 TIMES DAILY
Status: DISCONTINUED | OUTPATIENT
Start: 2024-11-08 | End: 2024-11-10 | Stop reason: HOSPADM

## 2024-11-08 RX ORDER — ZIPRASIDONE MESYLATE 20 MG/ML
INJECTION, POWDER, LYOPHILIZED, FOR SOLUTION INTRAMUSCULAR
Status: DISPENSED
Start: 2024-11-08 | End: 2024-11-08

## 2024-11-08 RX ORDER — DIPHENHYDRAMINE HYDROCHLORIDE 50 MG/ML
INJECTION INTRAMUSCULAR; INTRAVENOUS
Status: COMPLETED
Start: 2024-11-08 | End: 2024-11-08

## 2024-11-08 RX ORDER — ZIPRASIDONE MESYLATE 20 MG/ML
10 INJECTION, POWDER, LYOPHILIZED, FOR SOLUTION INTRAMUSCULAR ONCE
Status: ACTIVE | OUTPATIENT
Start: 2024-11-08 | End: 2024-11-09

## 2024-11-08 RX ORDER — DIPHENHYDRAMINE HYDROCHLORIDE 50 MG/ML
50 INJECTION INTRAMUSCULAR; INTRAVENOUS ONCE
Status: COMPLETED | OUTPATIENT
Start: 2024-11-08 | End: 2024-11-08

## 2024-11-08 RX ADMIN — DIPHENHYDRAMINE HYDROCHLORIDE 50 MG: 50 INJECTION INTRAMUSCULAR; INTRAVENOUS at 09:18

## 2024-11-08 RX ADMIN — LORAZEPAM 1 MG: 2 INJECTION INTRAMUSCULAR; INTRAVENOUS at 08:54

## 2024-11-08 RX ADMIN — CLONIDINE HYDROCHLORIDE 0.2 MG: 0.1 TABLET ORAL at 08:38

## 2024-11-08 RX ADMIN — ZIPRASIDONE MESYLATE 20 MG: 20 INJECTION, POWDER, LYOPHILIZED, FOR SOLUTION INTRAMUSCULAR at 16:47

## 2024-11-08 RX ADMIN — CLONIDINE HYDROCHLORIDE 0.2 MG: 0.1 TABLET ORAL at 14:37

## 2024-11-08 RX ADMIN — GABAPENTIN 400 MG: 400 CAPSULE ORAL at 06:47

## 2024-11-08 RX ADMIN — DIPHENHYDRAMINE HYDROCHLORIDE 50 MG: 50 INJECTION, SOLUTION INTRAMUSCULAR; INTRAVENOUS at 09:18

## 2024-11-08 RX ADMIN — LORAZEPAM 1 MG: 2 INJECTION INTRAMUSCULAR; INTRAVENOUS at 20:17

## 2024-11-08 RX ADMIN — GABAPENTIN 400 MG: 400 CAPSULE ORAL at 19:51

## 2024-11-08 RX ADMIN — GABAPENTIN 400 MG: 400 CAPSULE ORAL at 13:08

## 2024-11-08 RX ADMIN — NICOTINE TRANSDERMAL SYSTEM 21 MG: 21 PATCH, EXTENDED RELEASE TRANSDERMAL at 06:47

## 2024-11-08 RX ADMIN — RISPERIDONE 1 MG: 1 TABLET, FILM COATED ORAL at 19:52

## 2024-11-08 RX ADMIN — Medication 3 ML: at 09:23

## 2024-11-08 ASSESSMENT — ENCOUNTER SYMPTOMS: PSYCHIATRIC NEGATIVE: 1

## 2024-11-08 NOTE — ED NOTES
Patient report from MARTINA Pereira. Pt Alert respirations even and unlabored, on room air. Safety risk interventions in place.     Verified that Legal Hold is signed in patient's chart.  Safety checklist in use. Patient room is cleared of all items posing safety risk   Stop sign filled up and placed in front of pt's room  Patient is in direct view of 1:1 sitter and security, will continue to observe.  Standard fall precautions in place.

## 2024-11-08 NOTE — ED NOTES
Patient resting calmly on gurney.  Active chest rise noted.  1:1 sitter and security in direct view of patient.

## 2024-11-08 NOTE — ED NOTES
Report received from MARTINA Rangel. POC discussed. Pt ambulating around room, RR even and unlabored, 1 on 1 safety sitter and security remains outside of room, legal hold remains in chart. All legal hold protocols and precautions in place.

## 2024-11-08 NOTE — ED NOTES
Pt sitting on [employer] reading book, sitter in direct obs. Pt is calm and cooperative. Pt asking for Clonidine, last SBP <120.

## 2024-11-08 NOTE — ED NOTES
Attempted to call pt's sister per pt's request, no answer at this time, no voicemail left. Will attempt to call again. Pt ambulating around room, 1 on 1 safety sitter and security in place outside room.

## 2024-11-08 NOTE — ED NOTES
Pt nicotine patch not adhering well. Wrapped with coban. Pt given gurney to sleep on. Educated on necessity to remain calm and not destroy hospital equipment. Pt agrees.

## 2024-11-08 NOTE — ED NOTES
Patient report to MARTINA De Paz. Pt Alert respirations even and unlabored, on room air. Safety risk interventions in place.     Verified that Legal Hold is signed in patient's chart.  Safety checklist in use. Patient room is cleared of all items posing safety risk   Stop sign filled up and placed in front of pt's room  Patient is in direct view of 1:1 sitter and security sitter   Standard fall precautions in place.

## 2024-11-08 NOTE — ED NOTES
Patient pushing code blue button and staff assist buttons in room. Patient educated on use of buttons. No evidence of learning. Patient showing increasing signs of anxiety; however, refusing haldol at this time. Dr. Romero called and Dr. Romero to come to bedside. Medication ordered by Dr. Romero. Patient medicated. Patient placed on continuous monitoring. VSS.

## 2024-11-08 NOTE — DISCHARGE PLANNING
Alert Team/Behavioral Health   Note:      PAN ALERT TEAM DISCHARGE PLANNING NOTE    Date:  11/8/24  Patient Name:  Negro Galarza - 34 y.o. - Discharge Planning  MRN:  [id number]   YOB: 1990  ADMISSION DATE:  11/4/2024     Writer forwarded referral packet for inpatient psychiatric care to the following [facility] providers: [place] facilities = [facility], [facility], [facility], [facility]    Items included in the referral packet:   _x____Face Sheet   _x____Pages 1 and 2 of completed legal hold   _x____Alert Team/Psych Assessment   _x____H&P   _x____UDS   _x____Blood Alcohol   _x____Vital signs   _n/a____Pregnancy Test (if applicable)   _x____Medications List   _____Covid Screen

## 2024-11-08 NOTE — DISCHARGE PLANNING
Alert Team/Behavioral Health   Note:      - [place]: Transferred call to nurses' station for more information.    - [place]: Talked to Jose Rafael. Cannot accept patient due to court commitment.

## 2024-11-08 NOTE — DISCHARGE PLANNING
Alert Team/Behavioral Health   Note:      - [facility]: Talked to Intake RN (Shanthi). Referral is on pending list. No beds currently available. Also prioritizing uninsured patients.    - [facility] : Talked to [facility]. No beds currently available. Will let counselor know of inquiry and have them call back.  @1225: Intake Counselor (Perico) called. Referral is still pending review. No beds currently available.

## 2024-11-08 NOTE — ED NOTES
Bedside report received from off going RN: Sharmin, assumed care of patient.  POC discussed with patient. Call light within reach, all needs addressed at this time.       Fall risk interventions in place: Not Applicable (all applicable per [facility] Fall risk assessment)   Continuous monitoring: Not Applicable   IVF/IV medications: Not Applicable   Oxygen: Room Air  Bedside sitter: Pt on L2k SI with 1:1 sitter and security in direct view of patient (name), Report given to sitter, checklist completed, and checklist completed, stop sign in doorway  Isolation: Not Applicable

## 2024-11-08 NOTE — DISCHARGE PLANNING
Alert Team/Behavioral Health   Note:      Mental Health Transfer    Referral: transfer to Mental Health Facility    Intervention: Jose Rafael at [facility] in [place] called to accept patient.    Patient's accepting provider is Saji XAVIER.    Transport arranged through Avril at "[facility] ambulance.    The patient will be picked up @ [facility]/as soon as two [employer] crew members are scheduled for trip..    Notified Attending Provider (Heather XAVIER), Psychiatry APRN (Demetrius EISENBERG), Bedside RN (Zandra CHAU), Alert Team RN (Meaghan NAQVI), and Alert Team DPA (Eliane JARA) via Voalte message of the departure time as well as accepting facility.    "[facility] PCS Form scanned in .    Transfer packet to be created and placed in chart.    Plan: transfer to [facility] via [facility] [employer] ambulance for acute inpatient psychiatric care.
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
## 2024-11-08 NOTE — ED NOTES
Hourly round completed, patient resting with unlabored respirations. Face visible, No current needs identified.  Gurney in low position, side rail up for pt safety. 1:1 sitter and security is in direct view of patient, will continue to monitor patient.

## 2024-11-08 NOTE — PROGRESS NOTES
"ED Observation Progress Note    Date of Service: 11/08/24    Interval History and Interventions  The patient was initially seen by Dr. Escobedo and placed into ED observation status on November 4 secondary to suicidal and homicidal ideation.  Subsequently he has been seen by psychiatry yesterday and Dr. Adriana Cheng has placed him on mood stabilization and recommend psychiatric hospitalization.  The patient has been somewhat aggressive and agitated at times but seems to have calm down the last day or so    8:32 AM - Paged Dr. Garcia (Psychiatry)    Physical Exam  /70   Pulse 70   Temp 37 °C (98.6 °F) (Temporal)   Resp 18   Ht 1.803 m (5' 11\")   Wt 61.2 kg (135 lb)   SpO2 98%   BMI 18.83 kg/m² .    Constitutional: Awake and alert. Nontoxic  HENT:  Grossly normal  Eyes: Grossly normal  Neck: Normal range of motion  Cardiovascular: Normal heart rate   Thorax & Lungs: No respiratory distress  Abdomen: Nontender  Skin:  No pathologic rash.   Extremities: Well perfused  Psychiatric: Affect normal    Labs  Results for orders placed or performed during the hospital encounter of 11/04/24   POC BREATHALIZER    Collection Time: 11/04/24  2:05 PM   Result Value Ref Range    POC Breathalizer 0.00 0.00 - 0.01 Percent   Urine Drug Screen    Collection Time: 11/04/24  2:33 PM   Result Value Ref Range    Amphetamines Urine Negative Negative    Barbiturates Negative Negative    Benzodiazepines Negative Negative    Cocaine Metabolite Negative Negative    Fentanyl, Urine Negative Negative    Methadone Negative Negative    Opiates Negative Negative    Oxycodone Negative Negative    Phencyclidine -Pcp Negative Negative    Propoxyphene Negative Negative    Cannabinoid Metab Positive (A) Negative   EKG    Collection Time: 11/05/24  4:34 PM   Result Value Ref Range    Report       [facility] Emergency Dept.    Test Date:  2024-11-05  Pt Name:    BARBY WETZEL             Department: ER  MRN:        [id number]   "                    Room:       [facility]  Gender:     Male                         Technician: [id number]  :        1990                   Requested By:FELICITY BRAVO  Order #:    [id number]                    Reading MD: Robert Deleon    Measurements  Intervals                                Axis  Rate:       83                           P:          89  WA:         156                          QRS:        101  QRSD:       85                           T:          85  QT:         354  QTc:        416    Interpretive Statements  Sinus rhythm  No previous ECG available for comparison  Electronically Signed On 2024 22:36:52 PST by Robert Deleon     CBC WITH DIFFERENTIAL    Collection Time: 24  4:41 PM   Result Value Ref Range    WBC 8.4 4.8 - 10.8 K/uL    RBC 4.34 (L) 4.70 - 6.10 M/uL    Hemoglobin 14.5 14.0 - 18.0 g/dL    Hematocrit 42.2 42.0 - 52.0 %    MCV 97.2 81.4 - 97.8 fL    MCH 33.4 (H) 27.0 - 33.0 pg    MCHC 34.4 32.3 - 36.5 g/dL    RDW 42.9 35.9 - 50.0 fL    Platelet Count 285 164 - 446 K/uL    MPV 9.7 9.0 - 12.9 fL    Neutrophils-Polys 69.80 44.00 - 72.00 %    Lymphocytes 18.90 (L) 22.00 - 41.00 %    Monocytes 9.20 0.00 - 13.40 %    Eosinophils 1.70 0.00 - 6.90 %    Basophils 0.20 0.00 - 1.80 %    Immature Granulocytes 0.20 0.00 - 0.90 %    Nucleated RBC 0.00 0.00 - 0.20 /100 WBC    Neutrophils (Absolute) 5.87 1.82 - 7.42 K/uL    Lymphs (Absolute) 1.59 1.00 - 4.80 K/uL    Monos (Absolute) 0.77 0.00 - 0.85 K/uL    Eos (Absolute) 0.14 0.00 - 0.51 K/uL    Baso (Absolute) 0.02 0.00 - 0.12 K/uL    Immature Granulocytes (abs) 0.02 0.00 - 0.11 K/uL    NRBC (Absolute) 0.00 K/uL   Comp Metabolic Panel    Collection Time: 24  4:41 PM   Result Value Ref Range    Sodium 141 135 - 145 mmol/L    Potassium 4.9 3.6 - 5.5 mmol/L    Chloride 104 96 - 112 mmol/L    Co2 26 20 - 33 mmol/L    Anion Gap 11.0 7.0 - 16.0    Glucose 72 65 - 99 mg/dL    Bun 21 8 - 22 mg/dL    Creatinine 1.28 0.50 - 1.40 mg/dL    Calcium 9.6  8.5 - 10.5 mg/dL    Correct Calcium 9.4 8.5 - 10.5 mg/dL    AST(SGOT) 51 (H) 12 - 45 U/L    ALT(SGPT) 30 2 - 50 U/L    Alkaline Phosphatase 58 30 - 99 U/L    Total Bilirubin 0.5 0.1 - 1.5 mg/dL    Albumin 4.3 3.2 - 4.9 g/dL    Total Protein 7.2 6.0 - 8.2 g/dL    Globulin 2.9 1.9 - 3.5 g/dL    A-G Ratio 1.5 g/dL   TSH WITH REFLEX TO FT4    Collection Time: 11/05/24  4:41 PM   Result Value Ref Range    TSH 1.030 0.380 - 5.330 uIU/mL   ESTIMATED GFR    Collection Time: 11/05/24  4:41 PM   Result Value Ref Range    GFR (CKD-EPI) 75 >60 mL/min/1.73 m 2       The patient will not be going to a psychiatric facility to my knowledge today and will be reassessed by psychiatry later this afternoon if not tomorrow morning and seems to be improving on medications but I am trying to get some clarity regarding his additional recommendations yesterday by psychiatry for mood stabilization    I spoke with Dr. Moreira, psychiatry, at approximately 10:15 AM and he is going to reach out to the team and attempt to understand what medication was supposed to be started yesterday and he will follow-up on the patient today    Problem List  1.   1. Acute psychosis (HCC)        2. Medical clearance for psychiatric admission              Electronically signed by: Solomon Romero M.D., 11/8/2024 8:26 AM

## 2024-11-08 NOTE — ED NOTES
Patient open over head examination light stating I am cold , this light keep me warm, patient provided with additional warm blankets and educated.    1 2 0

## 2024-11-09 PROCEDURE — 700111 HCHG RX REV CODE 636 W/ 250 OVERRIDE (IP): Performed by: EMERGENCY MEDICINE

## 2024-11-09 PROCEDURE — 700102 HCHG RX REV CODE 250 W/ 637 OVERRIDE(OP): Performed by: PSYCHIATRY & NEUROLOGY

## 2024-11-09 PROCEDURE — A9270 NON-COVERED ITEM OR SERVICE: HCPCS | Performed by: EMERGENCY MEDICINE

## 2024-11-09 PROCEDURE — A9270 NON-COVERED ITEM OR SERVICE: HCPCS | Performed by: STUDENT IN AN ORGANIZED HEALTH CARE EDUCATION/TRAINING PROGRAM

## 2024-11-09 PROCEDURE — 96372 THER/PROPH/DIAG INJ SC/IM: CPT

## 2024-11-09 PROCEDURE — 700102 HCHG RX REV CODE 250 W/ 637 OVERRIDE(OP): Performed by: EMERGENCY MEDICINE

## 2024-11-09 PROCEDURE — A9270 NON-COVERED ITEM OR SERVICE: HCPCS

## 2024-11-09 PROCEDURE — 700102 HCHG RX REV CODE 250 W/ 637 OVERRIDE(OP)

## 2024-11-09 PROCEDURE — 700102 HCHG RX REV CODE 250 W/ 637 OVERRIDE(OP): Performed by: STUDENT IN AN ORGANIZED HEALTH CARE EDUCATION/TRAINING PROGRAM

## 2024-11-09 PROCEDURE — A9270 NON-COVERED ITEM OR SERVICE: HCPCS | Performed by: PSYCHIATRY & NEUROLOGY

## 2024-11-09 PROCEDURE — 99284 EMERGENCY DEPT VISIT MOD MDM: CPT | Mod: GC | Performed by: STUDENT IN AN ORGANIZED HEALTH CARE EDUCATION/TRAINING PROGRAM

## 2024-11-09 RX ORDER — LORAZEPAM 1 MG/1
1 TABLET ORAL ONCE
Status: COMPLETED | OUTPATIENT
Start: 2024-11-09 | End: 2024-11-09

## 2024-11-09 RX ORDER — DIPHENHYDRAMINE HCL 25 MG
50 TABLET ORAL ONCE
Status: COMPLETED | OUTPATIENT
Start: 2024-11-09 | End: 2024-11-09

## 2024-11-09 RX ORDER — DIPHENHYDRAMINE HCL 25 MG
25 TABLET ORAL ONCE
Status: COMPLETED | OUTPATIENT
Start: 2024-11-09 | End: 2024-11-09

## 2024-11-09 RX ADMIN — GABAPENTIN 400 MG: 400 CAPSULE ORAL at 18:28

## 2024-11-09 RX ADMIN — Medication 1 LOZENGE: at 21:50

## 2024-11-09 RX ADMIN — CLONIDINE HYDROCHLORIDE 0.2 MG: 0.1 TABLET ORAL at 15:55

## 2024-11-09 RX ADMIN — LORAZEPAM 1 MG: 2 INJECTION INTRAMUSCULAR; INTRAVENOUS at 11:18

## 2024-11-09 RX ADMIN — DIPHENHYDRAMINE HYDROCHLORIDE 25 MG: 25 TABLET ORAL at 04:11

## 2024-11-09 RX ADMIN — LORAZEPAM 1 MG: 1 TABLET ORAL at 16:03

## 2024-11-09 RX ADMIN — GABAPENTIN 400 MG: 400 CAPSULE ORAL at 11:14

## 2024-11-09 RX ADMIN — NICOTINE TRANSDERMAL SYSTEM 21 MG: 21 PATCH, EXTENDED RELEASE TRANSDERMAL at 05:08

## 2024-11-09 RX ADMIN — RISPERIDONE 1 MG: 1 TABLET, FILM COATED ORAL at 05:09

## 2024-11-09 RX ADMIN — LORAZEPAM 1 MG: 2 INJECTION INTRAMUSCULAR; INTRAVENOUS at 04:47

## 2024-11-09 RX ADMIN — Medication 1 LOZENGE: at 16:05

## 2024-11-09 RX ADMIN — DIPHENHYDRAMINE HYDROCHLORIDE 50 MG: 25 TABLET ORAL at 20:36

## 2024-11-09 RX ADMIN — Medication 1 LOZENGE: at 18:30

## 2024-11-09 RX ADMIN — RISPERIDONE 1 MG: 1 TABLET, FILM COATED ORAL at 18:28

## 2024-11-09 RX ADMIN — GABAPENTIN 400 MG: 400 CAPSULE ORAL at 05:09

## 2024-11-09 ASSESSMENT — ENCOUNTER SYMPTOMS
VOMITING: 0
CONSTIPATION: 0
PALPITATIONS: 0
DEPRESSION: 0
ABDOMINAL PAIN: 0
INSOMNIA: 1
NERVOUS/ANXIOUS: 1
DIZZINESS: 0
DIARRHEA: 0
HALLUCINATIONS: 0
HEADACHES: 0
MEMORY LOSS: 0
NAUSEA: 0

## 2024-11-09 ASSESSMENT — LIFESTYLE VARIABLES: SUBSTANCE_ABUSE: 0

## 2024-11-09 NOTE — PROGRESS NOTES
"ED Observation Progress Note    Date of Service: 11/08/24    Interval History and Interventions  4:49 PM: Patient apparently ran out of his room and somehow got into the pediatric ED, he ran through that department screening \"AMA\" over and over again before he was caught by security.  Patient given Geodon and placed in 4 point restraints out of concern that he would harm himself or somebody else.  Patient is clearly still psychotic but is following some basic commands.  Notable that patient stated that he had some kind of \"paradoxical reaction\" to antipsychotics but has not had one yet to the Geodon given after being placed in restraints.    Physical Exam  /73   Pulse 90   Temp 37 °C (98.6 °F) (Temporal)   Resp 16   Ht 1.803 m (5' 11\")   Wt 61.2 kg (135 lb)   SpO2 96%   BMI 18.83 kg/m² .    Constitutional: Awake, hypervigilant  HENT:  Grossly normal  Eyes: Grossly normal  Cardiovascular: Normal heart rate   Thorax & Lungs: No respiratory distress  Psychiatric: Affect anxious but following commands, flight of ideas    Labs  Results for orders placed or performed during the hospital encounter of 11/04/24   POC BREATHALIZER    Collection Time: 11/04/24  2:05 PM   Result Value Ref Range    POC Breathalizer 0.00 0.00 - 0.01 Percent   Urine Drug Screen    Collection Time: 11/04/24  2:33 PM   Result Value Ref Range    Amphetamines Urine Negative Negative    Barbiturates Negative Negative    Benzodiazepines Negative Negative    Cocaine Metabolite Negative Negative    Fentanyl, Urine Negative Negative    Methadone Negative Negative    Opiates Negative Negative    Oxycodone Negative Negative    Phencyclidine -Pcp Negative Negative    Propoxyphene Negative Negative    Cannabinoid Metab Positive (A) Negative   EKG    Collection Time: 11/05/24  4:34 PM   Result Value Ref Range    Report       [facility] Emergency Dept.    Test Date:  2024-11-05  Pt Name:    BARBY WETZEL             " Department: ER  MRN:        [id number]                      Room:       [facility]  Gender:     Male                         Technician: [id number]  :        1990                   Requested By:FELICITY BRAVO  Order #:    [id number]                    Reading MD: Robert Deleon    Measurements  Intervals                                Axis  Rate:       83                           P:          89  CO:         156                          QRS:        101  QRSD:       85                           T:          85  QT:         354  QTc:        416    Interpretive Statements  Sinus rhythm  No previous ECG available for comparison  Electronically Signed On 2024 22:36:52 PST by Robert Deleon     CBC WITH DIFFERENTIAL    Collection Time: 24  4:41 PM   Result Value Ref Range    WBC 8.4 4.8 - 10.8 K/uL    RBC 4.34 (L) 4.70 - 6.10 M/uL    Hemoglobin 14.5 14.0 - 18.0 g/dL    Hematocrit 42.2 42.0 - 52.0 %    MCV 97.2 81.4 - 97.8 fL    MCH 33.4 (H) 27.0 - 33.0 pg    MCHC 34.4 32.3 - 36.5 g/dL    RDW 42.9 35.9 - 50.0 fL    Platelet Count 285 164 - 446 K/uL    MPV 9.7 9.0 - 12.9 fL    Neutrophils-Polys 69.80 44.00 - 72.00 %    Lymphocytes 18.90 (L) 22.00 - 41.00 %    Monocytes 9.20 0.00 - 13.40 %    Eosinophils 1.70 0.00 - 6.90 %    Basophils 0.20 0.00 - 1.80 %    Immature Granulocytes 0.20 0.00 - 0.90 %    Nucleated RBC 0.00 0.00 - 0.20 /100 WBC    Neutrophils (Absolute) 5.87 1.82 - 7.42 K/uL    Lymphs (Absolute) 1.59 1.00 - 4.80 K/uL    Monos (Absolute) 0.77 0.00 - 0.85 K/uL    Eos (Absolute) 0.14 0.00 - 0.51 K/uL    Baso (Absolute) 0.02 0.00 - 0.12 K/uL    Immature Granulocytes (abs) 0.02 0.00 - 0.11 K/uL    NRBC (Absolute) 0.00 K/uL   Comp Metabolic Panel    Collection Time: 24  4:41 PM   Result Value Ref Range    Sodium 141 135 - 145 mmol/L    Potassium 4.9 3.6 - 5.5 mmol/L    Chloride 104 96 - 112 mmol/L    Co2 26 20 - 33 mmol/L    Anion Gap 11.0 7.0 - 16.0    Glucose 72 65 - 99 mg/dL    Bun 21 8 - 22 mg/dL    Creatinine  1.28 0.50 - 1.40 mg/dL    Calcium 9.6 8.5 - 10.5 mg/dL    Correct Calcium 9.4 8.5 - 10.5 mg/dL    AST(SGOT) 51 (H) 12 - 45 U/L    ALT(SGPT) 30 2 - 50 U/L    Alkaline Phosphatase 58 30 - 99 U/L    Total Bilirubin 0.5 0.1 - 1.5 mg/dL    Albumin 4.3 3.2 - 4.9 g/dL    Total Protein 7.2 6.0 - 8.2 g/dL    Globulin 2.9 1.9 - 3.5 g/dL    A-G Ratio 1.5 g/dL   TSH WITH REFLEX TO FT4    Collection Time: 11/05/24  4:41 PM   Result Value Ref Range    TSH 1.030 0.380 - 5.330 uIU/mL   ESTIMATED GFR    Collection Time: 11/05/24  4:41 PM   Result Value Ref Range    GFR (CKD-EPI) 75 >60 mL/min/1.73 m 2       Radiology  No orders to display       Problem List  1.   1. Acute psychosis (HCC)    2. Medical clearance for psychiatric admission         Electronically signed by: John Garcia M.D., 11/8/2024 4:49 PM

## 2024-11-09 NOTE — ED NOTES
Pt came out of room requesting to be placed on the shower list, security was called to add pt to shower list

## 2024-11-09 NOTE — ED NOTES
The pt is in restraints and yelling at staff, threatening to leave. Pt arguing with RN stating that he needs to leave with mother. Pt demanding RN to call mother to pick him up, so he can AMA. RN attempts to reorient the pt. Security and PSA at bedside ensuring the pt's safety. Water given. Vitals stable

## 2024-11-09 NOTE — ED NOTES
The pt is alert and coming into hallway. The pt yelling that she will leave and is refusing to get back into room.RN attempts to redirect the pt with no luck. The pt was finally redirected and complying after attempting to push security. Meds at bedside but pt able to deescalate with verbal redirection. Security sitter and PSA in hallway ensuring the pt's safety

## 2024-11-09 NOTE — ED NOTES
Bedside report received from off going RN/tech: Tony MACK, assumed care of patient.  POC discussed with patient. Call light within reach, all needs addressed at this time.       Fall risk interventions in place: Move the patient closer to the nurse's station, Place socks on patient, Give patient urinal if applicable, Keep floor surfaces clean and dry, and Accompanied to restroom (all applicable per [place] Fall risk assessment)   Continuous monitoring: Not Applicable   IVF/IV medications: Not Applicable   Oxygen: Room Air  Bedside sitter: Report given to sitter, checklist completed, and checklist completed, stop sign in doorway Security sitter.  Isolation: Not Applicable

## 2024-11-09 NOTE — ED NOTES
The pt is laying in bed calm and cooperative. Restraints readjusted. Security and PSA at bedside ensuring the pt's safety

## 2024-11-09 NOTE — ED NOTES
Patient taken out of restraints at this time. Verbalizes no harm to self or others. Patient ambulatory with security to restroom. Medicated per MAR for agitation and restlessness.

## 2024-11-09 NOTE — ED NOTES
The pt is in restraints and yelling at staff, threatening to leave. Restraints readjusted. Security and PSA at bedside ensuring the pt's safety. Pt states ok to update life partner. Rizwana updated on the plan of care. Water given
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
## 2024-11-09 NOTE — ED NOTES
Patient requested this RN check his vitals. Patient resting calmly on gurney with security sitter at bedside while vitals checked. 1 to 1 sitter and security sitter remain in direct view of patient.

## 2024-11-09 NOTE — ED NOTES
The pt is laying in bed calm and cooperative. Security and PSA in hallway ensuring the pt's safety

## 2024-11-09 NOTE — ED NOTES
Bedside report received from MARTINA Zavala. Assumed care of patient.  Patient aware of POC. Patient resting calmly on gurney. Sitter and security sitter within view of patient.

## 2024-11-09 NOTE — ED NOTES
Bedside report received from off going RN/tech: Germaine, assumed care of patient.  POC discussed with patient. Call light within reach, all needs addressed at this time.       Fall risk interventions in place: Not Applicable (all applicable per [place] Fall risk assessment)   Continuous monitoring: Not Applicable   IVF/IV medications: Not Applicable   Oxygen: Room Air  Bedside sitter: Pt on L2k SI with 1:1 sitter Jessica (name)  Isolation: Not Applicable
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
## 2024-11-09 NOTE — ED NOTES
The pt is alert and oriented. The pt is hyperverbal and attempting to come in the hallway. Security redirecting the pt back into the room. Security sitter and PSA in hallway ensuring the pt's safety

## 2024-11-09 NOTE — ED NOTES
Pt resting in bed, sitter and security outside room in direct line of sight to the pt, no signs of distress noted at this time

## 2024-11-09 NOTE — ED NOTES
Bedside report received from off going RN/tech: Elpidio, assumed care of patient.  POC discussed with patient. Call light within reach, all needs addressed at this time.       Fall risk interventions in place: Not Applicable (all applicable per [place] Fall risk assessment)   Continuous monitoring: Not Applicable   IVF/IV medications: Not Applicable   Oxygen: Room Air  Bedside sitter: Pt on L2k SI with 1:1 sitter security (name)  Isolation: Not Applicable

## 2024-11-09 NOTE — ED NOTES
The pt is alert and pacing around room. The pt occasionally attempts to leave room. Security redirects the pt back to room. The pt is calm and cooperative. Security sitter and PSA in hallway ensuring the pt's safety

## 2024-11-09 NOTE — ED NOTES
The pt is laying in bed with eyes closed. Breathing is even and unlabored. No indicators of pain noted. The pt arousable to voice and becomes oriented. The pt is calm and cooperative. Security sitter and PSA in hallway ensuring the pt's safety

## 2024-11-09 NOTE — ED NOTES
ERP at bedside and states to apply restraints for elopement risk. The pt continues to threaten staff and threaten to leave after med admin.

## 2024-11-09 NOTE — ED NOTES
Bedside report received from off going RN/tech: Je, assumed care of patient.  POC discussed with patient. Call light within reach, all needs addressed at this time.       Fall risk interventions in place: Not Applicable (all applicable per [place] Fall risk assessment)   Continuous monitoring: Not Applicable   IVF/IV medications: Not Applicable   Oxygen: Room Air  Bedside sitter: Not Applicable , Pt on L2k SI with 1:1 sitter [employer] (name), Report given to sitter, and checklist completed, stop sign in doorway  Isolation: Not Applicable

## 2024-11-09 NOTE — PROGRESS NOTES
"ED Observation Progress Note    Date of Service: 11/09/24    Interval History and Interventions  12:37 PM Patient care signed out to me from nighttime ERP.  Patient here with acute psychosis.  Currently, there are no beds available at [facility].  Patient is on a pending list at [facility].  Currently there are no beds available.  No adverse events under my care.  Yesterday evening, patient did attempt to elope and required chemical sedation.  Sitter outside the room.  Patient seen by psychiatry today.  I have reviewed their note.  He seemed somewhat more engaged today.  Still rejecting concerns for bipolar disorder.  He does have a known history per his mother of OCD and panic disorder.  Psychiatry will continue to follow the patient.  No new medication recommendations at this time.  Care will be signed out to oncoming ERP.    Physical Exam  /65   Pulse 69   Temp 36.9 °C (98.4 °F) (Temporal)   Resp 18   Ht 1.803 m (5' 11\")   Wt 61.2 kg (135 lb)   SpO2 100%   BMI 18.83 kg/m² .    Constitutional: Awake and alert. Nontoxic  HENT:  Grossly normal  Eyes: Grossly normal  Neck: Normal range of motion  Cardiovascular: Normal heart rate   Thorax & Lungs: No respiratory distress  Abdomen: Nontender  Skin:  No pathologic rash.   Extremities: Well perfused  Psychiatric: Affect normal    Labs  Results for orders placed or performed during the hospital encounter of 11/04/24   POC BREATHALIZER    Collection Time: 11/04/24  2:05 PM   Result Value Ref Range    POC Breathalizer 0.00 0.00 - 0.01 Percent   Urine Drug Screen    Collection Time: 11/04/24  2:33 PM   Result Value Ref Range    Amphetamines Urine Negative Negative    Barbiturates Negative Negative    Benzodiazepines Negative Negative    Cocaine Metabolite Negative Negative    Fentanyl, Urine Negative Negative    Methadone Negative Negative    Opiates Negative Negative    Oxycodone Negative Negative    Phencyclidine -Pcp Negative Negative    " Propoxyphene Negative Negative    Cannabinoid Metab Positive (A) Negative   EKG    Collection Time: 24  4:34 PM   Result Value Ref Range    Report       [facility] Emergency Dept.    Test Date:  2024  Pt Name:    BARBY WETZEL             Department: ER  MRN:        [id number]                      Room:       [facility]  Gender:     Male                         Technician: [id number]  :        1990                   Requested By:FELICITY BRAVO  Order #:    [id number]                    Reading MD: Robert Deleon    Measurements  Intervals                                Axis  Rate:       83                           P:          89  AK:         156                          QRS:        101  QRSD:       85                           T:          85  QT:         354  QTc:        416    Interpretive Statements  Sinus rhythm  No previous ECG available for comparison  Electronically Signed On 2024 22:36:52 PST by Robert Deleon     CBC WITH DIFFERENTIAL    Collection Time: 24  4:41 PM   Result Value Ref Range    WBC 8.4 4.8 - 10.8 K/uL    RBC 4.34 (L) 4.70 - 6.10 M/uL    Hemoglobin 14.5 14.0 - 18.0 g/dL    Hematocrit 42.2 42.0 - 52.0 %    MCV 97.2 81.4 - 97.8 fL    MCH 33.4 (H) 27.0 - 33.0 pg    MCHC 34.4 32.3 - 36.5 g/dL    RDW 42.9 35.9 - 50.0 fL    Platelet Count 285 164 - 446 K/uL    MPV 9.7 9.0 - 12.9 fL    Neutrophils-Polys 69.80 44.00 - 72.00 %    Lymphocytes 18.90 (L) 22.00 - 41.00 %    Monocytes 9.20 0.00 - 13.40 %    Eosinophils 1.70 0.00 - 6.90 %    Basophils 0.20 0.00 - 1.80 %    Immature Granulocytes 0.20 0.00 - 0.90 %    Nucleated RBC 0.00 0.00 - 0.20 /100 WBC    Neutrophils (Absolute) 5.87 1.82 - 7.42 K/uL    Lymphs (Absolute) 1.59 1.00 - 4.80 K/uL    Monos (Absolute) 0.77 0.00 - 0.85 K/uL    Eos (Absolute) 0.14 0.00 - 0.51 K/uL    Baso (Absolute) 0.02 0.00 - 0.12 K/uL    Immature Granulocytes (abs) 0.02 0.00 - 0.11 K/uL    NRBC (Absolute) 0.00 K/uL   Comp Metabolic Panel     Collection Time: 11/05/24  4:41 PM   Result Value Ref Range    Sodium 141 135 - 145 mmol/L    Potassium 4.9 3.6 - 5.5 mmol/L    Chloride 104 96 - 112 mmol/L    Co2 26 20 - 33 mmol/L    Anion Gap 11.0 7.0 - 16.0    Glucose 72 65 - 99 mg/dL    Bun 21 8 - 22 mg/dL    Creatinine 1.28 0.50 - 1.40 mg/dL    Calcium 9.6 8.5 - 10.5 mg/dL    Correct Calcium 9.4 8.5 - 10.5 mg/dL    AST(SGOT) 51 (H) 12 - 45 U/L    ALT(SGPT) 30 2 - 50 U/L    Alkaline Phosphatase 58 30 - 99 U/L    Total Bilirubin 0.5 0.1 - 1.5 mg/dL    Albumin 4.3 3.2 - 4.9 g/dL    Total Protein 7.2 6.0 - 8.2 g/dL    Globulin 2.9 1.9 - 3.5 g/dL    A-G Ratio 1.5 g/dL   TSH WITH REFLEX TO FT4    Collection Time: 11/05/24  4:41 PM   Result Value Ref Range    TSH 1.030 0.380 - 5.330 uIU/mL   ESTIMATED GFR    Collection Time: 11/05/24  4:41 PM   Result Value Ref Range    GFR (CKD-EPI) 75 >60 mL/min/1.73 m 2       Radiology  No orders to display       Problem List  1. Acute psychosis (HCC)        2. Medical clearance for psychiatric admission              Electronically signed by: Mary Ellen Patricia D.O., 11/9/2024 6:54 AM

## 2024-11-09 NOTE — ED NOTES
Meaghan RN at bedside speaking with patient due to increasing agitation. Security sitter and 1 to 1 sitter within view of patient.

## 2024-11-09 NOTE — ED NOTES
Rounded on pt. Respirations equal and unlabored. No acute distress at this time. Security in line of site of site of patient. Safety measures in place.

## 2024-11-09 NOTE — ED NOTES
Pt requested to have ativan given, medication was given, pt laying in bed, no signs of distress noted at this time, security and sitter outside room in direct line of sight to the pt

## 2024-11-09 NOTE — DISCHARGE PLANNING
Alert Team/Behavioral Health   Note:      - [facility] : No beds currently available.    - [facility]: Referral is on pending list. No beds currently available.    - [facility] will not accept patient due to court commitment and the need to change its jurisdiction.

## 2024-11-09 NOTE — ED NOTES
Pt came to door and asked for two peanut butter and Jelly sandwiches, pt was provided with breakfast tray, sitter and security at bedside
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
## 2024-11-09 NOTE — ED NOTES
Checked on bed, with unlabored respirations. No safety risk noted  Sleeping  Security sitter - 1:1 with continuous visual monitoring by Trained Personnel  Continued safety precaution  Isacryousif in low position, side rail up for pt safety.   No needs identified at the moment

## 2024-11-09 NOTE — ED NOTES
The pt is in restraints and yelling at staff, threatening to leave. Restraints readjusted. Security and PSA at bedside ensuring the pt's safety

## 2024-11-10 ENCOUNTER — APPOINTMENT (OUTPATIENT)
Dept: RADIOLOGY | Facility: MEDICAL CENTER | Age: 34
End: 2024-11-10
Attending: EMERGENCY MEDICINE
Payer: COMMERCIAL

## 2024-11-10 VITALS
SYSTOLIC BLOOD PRESSURE: 119 MMHG | DIASTOLIC BLOOD PRESSURE: 72 MMHG | BODY MASS INDEX: 18.9 KG/M2 | HEIGHT: 71 IN | HEART RATE: 78 BPM | RESPIRATION RATE: 16 BRPM | TEMPERATURE: 98.3 F | OXYGEN SATURATION: 98 % | WEIGHT: 135 LBS

## 2024-11-10 PROCEDURE — A9270 NON-COVERED ITEM OR SERVICE: HCPCS | Performed by: EMERGENCY MEDICINE

## 2024-11-10 PROCEDURE — A9270 NON-COVERED ITEM OR SERVICE: HCPCS

## 2024-11-10 PROCEDURE — A9270 NON-COVERED ITEM OR SERVICE: HCPCS | Performed by: PSYCHIATRY & NEUROLOGY

## 2024-11-10 PROCEDURE — 700102 HCHG RX REV CODE 250 W/ 637 OVERRIDE(OP)

## 2024-11-10 PROCEDURE — 71045 X-RAY EXAM CHEST 1 VIEW: CPT

## 2024-11-10 PROCEDURE — 700102 HCHG RX REV CODE 250 W/ 637 OVERRIDE(OP): Performed by: EMERGENCY MEDICINE

## 2024-11-10 PROCEDURE — 700102 HCHG RX REV CODE 250 W/ 637 OVERRIDE(OP): Performed by: PSYCHIATRY & NEUROLOGY

## 2024-11-10 RX ORDER — LORAZEPAM 2 MG/1
2 TABLET ORAL ONCE
Status: COMPLETED | OUTPATIENT
Start: 2024-11-10 | End: 2024-11-10

## 2024-11-10 RX ADMIN — GABAPENTIN 400 MG: 400 CAPSULE ORAL at 06:14

## 2024-11-10 RX ADMIN — Medication 1 LOZENGE: at 04:56

## 2024-11-10 RX ADMIN — NICOTINE TRANSDERMAL SYSTEM 21 MG: 21 PATCH, EXTENDED RELEASE TRANSDERMAL at 06:13

## 2024-11-10 RX ADMIN — RISPERIDONE 1 MG: 1 TABLET, FILM COATED ORAL at 06:13

## 2024-11-10 RX ADMIN — Medication 1 LOZENGE: at 00:33

## 2024-11-10 RX ADMIN — LORAZEPAM 2 MG: 2 TABLET ORAL at 00:32

## 2024-11-10 NOTE — DISCHARGE PLANNING
"@ 1837 - Facilitated 1:1 peer support to assist PT with filling out an BETTY and PT chose his parents Raul and Sharon.  PT was asked how long does he want the BETTY to be effective.  PT's first response was \"indefinite,\" PT was advised the longest he can choose is a year from this date.  PT chose a year from this date: 11/9/2025.  PT was read what the specifics of the release entails:  1) Release of drug/alcohol use results/records  2) Release of [facility] records    PT initialed the above specifics of release of information.    PT signed release.  Security sitter witnessed the interaction.    PT advised he could only make 2 calls a day and that includes incoming and outgoing calls.    @1847 - Alert RN advised this PRSS that the PT was accepted to [facility] in [facility].  Alert RN is preparing the transport/transfer packet.  "

## 2024-11-10 NOTE — ED NOTES
Report received from Nhan MACK. Pt sitting on gurney in room, 1:1 sitter and security within direct view of pt. Room safety precautions and stop sign in place.

## 2024-11-10 NOTE — DISCHARGE PLANNING
@1937 - PT observed ambulatory with a steady gait. PT ambulated to  twice since 1800.  PT gets in and out of bed with no difficulty and ambulates around the room.  PT speech is linear, logical and volume, tone and context is appropriate.  However, PT does exhibit slow speech.  PT communicates with security and sitter and is compliant and redirectable.

## 2024-11-10 NOTE — ED NOTES
Pt sleeping on gurney in nad, even and unlabored respirations noted. 1:1 sitter and security within direct view of pt, room safety precautions in place.

## 2024-11-10 NOTE — ED NOTES
CHARITYSA at bedside for pt transfer, pt compliant with transfer and all belongings with pt. Pt provided crayons and  paper for in route. Pt left with EMS and all legal hold papers included.

## 2024-11-10 NOTE — PROGRESS NOTES
"ED Observation Progress Note    Date of Service: 11/10/24    Interval History and Interventions  Briefly this is a 34-year-old female who initially presented for psychosis on 11/4/2024.  Patient was on a legal hold.  Psychiatry team has been following patient.  Patient has flight of ideas this morning, poor insight into his current hospitalization.  He is complaining of rib pain from fall a couple of days ago.  Chest x-ray ordered demonstrates no acute abnormalities.  At this time he is pending inpatient psychiatric placement.    Physical Exam  /86   Pulse 85   Temp 37.2 °C (99 °F) (Temporal)   Resp 16   Ht 1.803 m (5' 11\")   Wt 61.2 kg (135 lb)   SpO2 98%   BMI 18.83 kg/m² .    Constitutional: Awake and alert. Nontoxic  HENT:  Grossly normal  Eyes: Grossly normal  Neck: Normal range of motion  Cardiovascular: Normal heart rate   Thorax & Lungs: No respiratory distress  Skin:  No pathologic rash.   Extremities: Well perfused  Psychiatric: Affect normal    Labs  Results for orders placed or performed during the hospital encounter of 11/04/24   POC BREATHALIZER    Collection Time: 11/04/24  2:05 PM   Result Value Ref Range    POC Breathalizer 0.00 0.00 - 0.01 Percent   Urine Drug Screen    Collection Time: 11/04/24  2:33 PM   Result Value Ref Range    Amphetamines Urine Negative Negative    Barbiturates Negative Negative    Benzodiazepines Negative Negative    Cocaine Metabolite Negative Negative    Fentanyl, Urine Negative Negative    Methadone Negative Negative    Opiates Negative Negative    Oxycodone Negative Negative    Phencyclidine -Pcp Negative Negative    Propoxyphene Negative Negative    Cannabinoid Metab Positive (A) Negative   EKG    Collection Time: 11/05/24  4:34 PM   Result Value Ref Range    Report       [facility] Emergency Dept.    Test Date:  2024-11-05  Pt Name:    BARBY WETZEL             Department: ER  MRN:        [id number]                      Room:       [facility]" 37  Gender:     Male                         Technician: [id number]  :        1990                   Requested By:FELICITY BRAVO  Order #:    [id number]                    Reading MD: Robert Deleon    Measurements  Intervals                                Axis  Rate:       83                           P:          89  SD:         156                          QRS:        101  QRSD:       85                           T:          85  QT:         354  QTc:        416    Interpretive Statements  Sinus rhythm  No previous ECG available for comparison  Electronically Signed On 2024 22:36:52 PST by Robert Deleon     CBC WITH DIFFERENTIAL    Collection Time: 24  4:41 PM   Result Value Ref Range    WBC 8.4 4.8 - 10.8 K/uL    RBC 4.34 (L) 4.70 - 6.10 M/uL    Hemoglobin 14.5 14.0 - 18.0 g/dL    Hematocrit 42.2 42.0 - 52.0 %    MCV 97.2 81.4 - 97.8 fL    MCH 33.4 (H) 27.0 - 33.0 pg    MCHC 34.4 32.3 - 36.5 g/dL    RDW 42.9 35.9 - 50.0 fL    Platelet Count 285 164 - 446 K/uL    MPV 9.7 9.0 - 12.9 fL    Neutrophils-Polys 69.80 44.00 - 72.00 %    Lymphocytes 18.90 (L) 22.00 - 41.00 %    Monocytes 9.20 0.00 - 13.40 %    Eosinophils 1.70 0.00 - 6.90 %    Basophils 0.20 0.00 - 1.80 %    Immature Granulocytes 0.20 0.00 - 0.90 %    Nucleated RBC 0.00 0.00 - 0.20 /100 WBC    Neutrophils (Absolute) 5.87 1.82 - 7.42 K/uL    Lymphs (Absolute) 1.59 1.00 - 4.80 K/uL    Monos (Absolute) 0.77 0.00 - 0.85 K/uL    Eos (Absolute) 0.14 0.00 - 0.51 K/uL    Baso (Absolute) 0.02 0.00 - 0.12 K/uL    Immature Granulocytes (abs) 0.02 0.00 - 0.11 K/uL    NRBC (Absolute) 0.00 K/uL   Comp Metabolic Panel    Collection Time: 24  4:41 PM   Result Value Ref Range    Sodium 141 135 - 145 mmol/L    Potassium 4.9 3.6 - 5.5 mmol/L    Chloride 104 96 - 112 mmol/L    Co2 26 20 - 33 mmol/L    Anion Gap 11.0 7.0 - 16.0    Glucose 72 65 - 99 mg/dL    Bun 21 8 - 22 mg/dL    Creatinine 1.28 0.50 - 1.40 mg/dL    Calcium 9.6 8.5 - 10.5 mg/dL    Correct  Calcium 9.4 8.5 - 10.5 mg/dL    AST(SGOT) 51 (H) 12 - 45 U/L    ALT(SGPT) 30 2 - 50 U/L    Alkaline Phosphatase 58 30 - 99 U/L    Total Bilirubin 0.5 0.1 - 1.5 mg/dL    Albumin 4.3 3.2 - 4.9 g/dL    Total Protein 7.2 6.0 - 8.2 g/dL    Globulin 2.9 1.9 - 3.5 g/dL    A-G Ratio 1.5 g/dL   TSH WITH REFLEX TO FT4    Collection Time: 11/05/24  4:41 PM   Result Value Ref Range    TSH 1.030 0.380 - 5.330 uIU/mL   ESTIMATED GFR    Collection Time: 11/05/24  4:41 PM   Result Value Ref Range    GFR (CKD-EPI) 75 >60 mL/min/1.73 m 2       Radiology  No orders to display       Problem List  1.  Psychosis-on legal hold pending inpatient psychiatric placement    Electronically signed by: Jessica Flaherty M.D., 11/10/2024 4:17 AM

## 2024-11-10 NOTE — ED NOTES
Patient resting calmly on gurney.  Active chest rise noted.  1 to 1 sitter and security sitter in direct view of patient.

## 2024-11-10 NOTE — DISCHARGE PLANNING
ALERT team  note:  Per Sondra at [facility] [place] intake/admission dept, phone [phone], pt is accepted to their facility with accepting MD, Dr. Solano    Alert  team to schedule pt transport with [facility]; [facility] Physician's Certification Statement (PCS) faxed to [facility], fax [phone]: per [facility] , [place], pt's transport request is placed on will-call until they can coordinate and schedule an ambulance crew to transport pt; they will callback when this is scheduled    Writer RN notified ED MARTINA Justin and [facility] ERP Dr.. Morris

## 2024-11-10 NOTE — ED NOTES
Report given to MARTINA Bernstein at [facility]. Pt transport scheduled for 0900

## 2024-11-10 NOTE — DISCHARGE PLANNING
Alert Team peer Recovery  meet with the patient to talk about what he does in his life. Patient told PRSS he likes to run and he is training to run 100 miles on the [employer] trail. Patient also said he like going up to [employer] and run on those trails. While PRSS and the patient was talking patient appeared calm  although the conversation would go in several direction. The PRSS was able to follow and understand what the patient was talking about.

## 2024-11-10 NOTE — ED NOTES
Patient speaking with mother Sharon on portable phone. Making delusional statements about becoming president in 4 years and other flights of ideas. Sitter and security sitter in view of patient.

## 2024-11-10 NOTE — ED NOTES
Pt was very cooperative for the duration of the night. He displayed no aggression or inclination towards violence.

## 2024-11-10 NOTE — ED NOTES
Pt resting on gurney in nad, denies needs at this time. 1:1 sitter and security within direct view of pt, room safety precautions in place.

## 2024-11-10 NOTE — ED NOTES
"Received report form MARTINA Rand. Provided pt with food and paper per request. Pt denies HI at this time, states \"my mind is fractured\". Pt is calm and follows commands. Security and sitter at bedside.     All items removed from room for safety and to minimize risk of HI, pt permitted paper and crayons. Verified that Legal Hold appropriate and signed in patient's chart. Confirmed patient's personal items removed from room, and if applicable labeled/placed in secure area.        1:1 Observation with security addition. Continuous visual monitoring by Trained Personnel. Sitter has unobstructed view of patient at all times. Discussion with sitter about patient care, safety, and support.    "

## 2024-11-10 NOTE — ED NOTES
Patient resting on stretcher in no acute distress. Equal chest rise and fall noted. Security in doorway for direct 1:1 observation. Room safety checklist in use. No needs at this time, will continue to monitor

## 2024-11-10 NOTE — DISCHARGE PLANNING
Alert Team:    Received call from [facility] stating that they are still looking for a crew to transport pt to [place].    Jaron MARIEA

## 2024-11-10 NOTE — ED NOTES
Medicated per MAR. Patient allowed to call mother, became agitated and raised voice at mother. Phone call ended by Roz MACK.

## 2024-11-10 NOTE — DISCHARGE SUMMARY
"  ED Observation Discharge Summary    Patient:Negro Galarza  Patient : 1990  Patient MRN: [id number]  Patient PCP: No primary care provider on file.    Admit Date: 2024  Discharge Date and Time: 11/10/24 9:09 AM  Discharge Diagnosis: Psychosis  Discharge Attending: Jessica Flaherty M.D.  Discharge Service: ED Observation    ED Course  Negro is a 34 y.o. male who was evaluated at [facility] for evaluation of psychosis.  Patient was on a legal hold and was being followed by psychiatry team.  He was felt to still need inpatient psychiatric stabilization.  During hospital course he did require restraint a couple of days prior to discharge as he attempted to elope from the hospital.  During this episode he did fall and was complaining of rib pain this morning.  X-ray was done which was negative for acute injury.  He was ultimately accepted at [address] [place] [address].  He was discharged from here for ongoing psychiatric care.    Discharge Exam:  /72   Pulse 78   Temp 36.8 °C (98.3 °F) (Temporal)   Resp 16   Ht 1.803 m (5' 11\")   Wt 61.2 kg (135 lb)   SpO2 98%   BMI 18.83 kg/m² .    Constitutional: Awake and alert. Nontoxic  HENT:  Grossly normal  Eyes: Grossly normal  Neck: Normal range of motion  Cardiovascular: Normal heart rate   Thorax & Lungs: No respiratory distress  Skin:  No pathologic rash.   Extremities: Well perfused  Psychiatric: Affect normal    Labs  Results for orders placed or performed during the hospital encounter of 24   POC BREATHALIZER    Collection Time: 24  2:05 PM   Result Value Ref Range    POC Breathalizer 0.00 0.00 - 0.01 Percent   Urine Drug Screen    Collection Time: 24  2:33 PM   Result Value Ref Range    Amphetamines Urine Negative Negative    Barbiturates Negative Negative    Benzodiazepines Negative Negative    Cocaine Metabolite Negative Negative    Fentanyl, Urine Negative Negative    Methadone Negative Negative    Opiates " Negative Negative    Oxycodone Negative Negative    Phencyclidine -Pcp Negative Negative    Propoxyphene Negative Negative    Cannabinoid Metab Positive (A) Negative   EKG    Collection Time: 24  4:34 PM   Result Value Ref Range    Report       [facility] Emergency Dept.    Test Date:  2024  Pt Name:    BARBY WETZEL             Department: ER  MRN:        [id number]                      Room:       [facility]  Gender:     Male                         Technician: [id number]  :        1990                   Requested By:FELICITY BRAVO  Order #:    [id number]                    Reading MD: Robert Deleon    Measurements  Intervals                                Axis  Rate:       83                           P:          89  DC:         156                          QRS:        101  QRSD:       85                           T:          85  QT:         354  QTc:        416    Interpretive Statements  Sinus rhythm  No previous ECG available for comparison  Electronically Signed On 2024 22:36:52 PST by Robert Deleon     CBC WITH DIFFERENTIAL    Collection Time: 24  4:41 PM   Result Value Ref Range    WBC 8.4 4.8 - 10.8 K/uL    RBC 4.34 (L) 4.70 - 6.10 M/uL    Hemoglobin 14.5 14.0 - 18.0 g/dL    Hematocrit 42.2 42.0 - 52.0 %    MCV 97.2 81.4 - 97.8 fL    MCH 33.4 (H) 27.0 - 33.0 pg    MCHC 34.4 32.3 - 36.5 g/dL    RDW 42.9 35.9 - 50.0 fL    Platelet Count 285 164 - 446 K/uL    MPV 9.7 9.0 - 12.9 fL    Neutrophils-Polys 69.80 44.00 - 72.00 %    Lymphocytes 18.90 (L) 22.00 - 41.00 %    Monocytes 9.20 0.00 - 13.40 %    Eosinophils 1.70 0.00 - 6.90 %    Basophils 0.20 0.00 - 1.80 %    Immature Granulocytes 0.20 0.00 - 0.90 %    Nucleated RBC 0.00 0.00 - 0.20 /100 WBC    Neutrophils (Absolute) 5.87 1.82 - 7.42 K/uL    Lymphs (Absolute) 1.59 1.00 - 4.80 K/uL    Monos (Absolute) 0.77 0.00 - 0.85 K/uL    Eos (Absolute) 0.14 0.00 - 0.51 K/uL    Baso (Absolute) 0.02 0.00 - 0.12 K/uL    Immature  Granulocytes (abs) 0.02 0.00 - 0.11 K/uL    NRBC (Absolute) 0.00 K/uL   Comp Metabolic Panel    Collection Time: 11/05/24  4:41 PM   Result Value Ref Range    Sodium 141 135 - 145 mmol/L    Potassium 4.9 3.6 - 5.5 mmol/L    Chloride 104 96 - 112 mmol/L    Co2 26 20 - 33 mmol/L    Anion Gap 11.0 7.0 - 16.0    Glucose 72 65 - 99 mg/dL    Bun 21 8 - 22 mg/dL    Creatinine 1.28 0.50 - 1.40 mg/dL    Calcium 9.6 8.5 - 10.5 mg/dL    Correct Calcium 9.4 8.5 - 10.5 mg/dL    AST(SGOT) 51 (H) 12 - 45 U/L    ALT(SGPT) 30 2 - 50 U/L    Alkaline Phosphatase 58 30 - 99 U/L    Total Bilirubin 0.5 0.1 - 1.5 mg/dL    Albumin 4.3 3.2 - 4.9 g/dL    Total Protein 7.2 6.0 - 8.2 g/dL    Globulin 2.9 1.9 - 3.5 g/dL    A-G Ratio 1.5 g/dL   TSH WITH REFLEX TO FT4    Collection Time: 11/05/24  4:41 PM   Result Value Ref Range    TSH 1.030 0.380 - 5.330 uIU/mL   ESTIMATED GFR    Collection Time: 11/05/24  4:41 PM   Result Value Ref Range    GFR (CKD-EPI) 75 >60 mL/min/1.73 m 2       Radiology  DX-CHEST-PORTABLE (1 VIEW)   Final Result   Impression:      1. No acute cardiopulmonary abnormalities identified.                         Medications:   Discharge Medication List as of 11/10/2024  8:55 AM          My final assessment includes   Upon Reevaluation, the patient's condition has: not improved; and will be escalated to inpatient psychiatric hospitalization.    Patient discharged from ED Observation status at 9:09 AM on 11/10/2024    Total time spent on this ED Observation discharge encounter is > 30 Minutes    Electronically signed by: Jessica Flaherty M.D., 11/10/2024 10:58 AM

## 2024-11-10 NOTE — ED NOTES
Pt sitting on trevon in nad, reading provided book. 1:1 sitter and security within direct view of pt.

## 2024-11-10 NOTE — ED NOTES
Medicated per MAR. Patient remains calm at this time. Security sitter and 1 to 1 sitter within view of patient.

## 2024-11-10 NOTE — ED NOTES
Alert team at bedside. Per bridgette MACK from Alert team patient has been accepted at [facility] in [place]. Alert team to arrange [facility] transport.

## 2024-11-10 NOTE — DISCHARGE PLANNING
@1821 - Collaborated with Alert and ER staff to develop a comprehensive care and DC plan.  This PRSS had interacted with PT during his last stay last week.  PT admitted for the same chief complaint for Psyche Eval, pending acceptance to [facility].  Alert RN reports PT has a court order for commitment and awaiting [facility]. PT has requested to fill out an BETTY and this PRSS has been tasked to assist PT with filling it out.

## 2024-11-14 ENCOUNTER — APPOINTMENT (OUTPATIENT)
Dept: BEHAVIORAL HEALTH | Facility: CLINIC | Age: 34
End: 2024-11-14
Payer: COMMERCIAL